# Patient Record
Sex: FEMALE | Race: OTHER
[De-identification: names, ages, dates, MRNs, and addresses within clinical notes are randomized per-mention and may not be internally consistent; named-entity substitution may affect disease eponyms.]

---

## 2017-08-30 ENCOUNTER — HOSPITAL ENCOUNTER (EMERGENCY)
Dept: HOSPITAL 31 - C.ER | Age: 47
Discharge: HOME | End: 2017-08-30
Payer: COMMERCIAL

## 2017-08-30 VITALS — HEART RATE: 87 BPM

## 2017-08-30 VITALS — OXYGEN SATURATION: 100 % | RESPIRATION RATE: 20 BRPM

## 2017-08-30 VITALS — BODY MASS INDEX: 31.8 KG/M2

## 2017-08-30 VITALS — DIASTOLIC BLOOD PRESSURE: 93 MMHG | SYSTOLIC BLOOD PRESSURE: 153 MMHG

## 2017-08-30 DIAGNOSIS — E11.65: Primary | ICD-10-CM

## 2017-08-30 DIAGNOSIS — E78.00: ICD-10-CM

## 2017-08-30 DIAGNOSIS — I10: ICD-10-CM

## 2017-08-30 LAB
ALBUMIN/GLOB SERPL: 1.1 {RATIO} (ref 1–2.1)
ALP SERPL-CCNC: 276 U/L (ref 38–126)
ALT SERPL-CCNC: 37 U/L (ref 9–52)
AST SERPL-CCNC: 29 U/L (ref 14–36)
BASOPHILS # BLD AUTO: 0 K/UL (ref 0–0.2)
BASOPHILS NFR BLD: 0.6 % (ref 0–2)
BILIRUB SERPL-MCNC: 0.9 MG/DL (ref 0.2–1.3)
BILIRUB UR-MCNC: NEGATIVE MG/DL
BUN SERPL-MCNC: 13 MG/DL (ref 7–17)
CALCIUM SERPL-MCNC: 9.5 MG/DL (ref 8.6–10.4)
CHLORIDE SERPL-SCNC: 98 MMOL/L (ref 98–107)
CO2 SERPL-SCNC: 24 MMOL/L (ref 22–30)
EOSINOPHIL # BLD AUTO: 0.1 K/UL (ref 0–0.7)
EOSINOPHIL NFR BLD: 1.5 % (ref 0–4)
ERYTHROCYTE [DISTWIDTH] IN BLOOD BY AUTOMATED COUNT: 13.5 % (ref 11.5–14.5)
GLOBULIN SER-MCNC: 3.6 GM/DL (ref 2.2–3.9)
GLUCOSE SERPL-MCNC: 409 MG/DL (ref 65–105)
GLUCOSE UR STRIP-MCNC: (no result) MG/DL
HCT VFR BLD CALC: 40.9 % (ref 34–47)
KETONES UR STRIP-MCNC: NEGATIVE MG/DL
LEUKOCYTE ESTERASE UR-ACNC: (no result) LEU/UL
LYMPHOCYTES # BLD AUTO: 2.4 K/UL (ref 1–4.3)
LYMPHOCYTES NFR BLD AUTO: 35.7 % (ref 20–40)
MCH RBC QN AUTO: 27.3 PG (ref 27–31)
MCHC RBC AUTO-ENTMCNC: 33.1 G/DL (ref 33–37)
MCV RBC AUTO: 82.5 FL (ref 81–99)
MONOCYTES # BLD: 0.6 K/UL (ref 0–0.8)
MONOCYTES NFR BLD: 9.1 % (ref 0–10)
NRBC BLD AUTO-RTO: 0.1 % (ref 0–2)
PH UR STRIP: 5 [PH] (ref 5–8)
PLATELET # BLD: 189 K/UL (ref 130–400)
PMV BLD AUTO: 10.2 FL (ref 7.2–11.7)
POTASSIUM SERPL-SCNC: 4.1 MMOL/L (ref 3.6–5.2)
PROT SERPL-MCNC: 7.6 G/DL (ref 6.3–8.3)
PROT UR STRIP-MCNC: NEGATIVE MG/DL
RBC # UR STRIP: NEGATIVE /UL
RBC #/AREA URNS HPF: < 1 /HPF (ref 0–3)
SODIUM SERPL-SCNC: 138 MMOL/L (ref 132–148)
SP GR UR STRIP: 1.02 (ref 1–1.03)
UROBILINOGEN UR-MCNC: NORMAL MG/DL (ref 0.2–1)
WBC # BLD AUTO: 6.8 K/UL (ref 4.8–10.8)
WBC #/AREA URNS HPF: < 1 /HPF (ref 0–5)

## 2017-08-30 PROCEDURE — 93005 ELECTROCARDIOGRAM TRACING: CPT

## 2017-08-30 PROCEDURE — 87086 URINE CULTURE/COLONY COUNT: CPT

## 2017-08-30 PROCEDURE — 84484 ASSAY OF TROPONIN QUANT: CPT

## 2017-08-30 PROCEDURE — 96360 HYDRATION IV INFUSION INIT: CPT

## 2017-08-30 PROCEDURE — 82948 REAGENT STRIP/BLOOD GLUCOSE: CPT

## 2017-08-30 PROCEDURE — 85025 COMPLETE CBC W/AUTO DIFF WBC: CPT

## 2017-08-30 PROCEDURE — 82009 KETONE BODYS QUAL: CPT

## 2017-08-30 PROCEDURE — 71020: CPT

## 2017-08-30 PROCEDURE — 99285 EMERGENCY DEPT VISIT HI MDM: CPT

## 2017-08-30 PROCEDURE — 82550 ASSAY OF CK (CPK): CPT

## 2017-08-30 PROCEDURE — 80053 COMPREHEN METABOLIC PANEL: CPT

## 2017-08-30 PROCEDURE — 82553 CREATINE MB FRACTION: CPT

## 2017-08-30 PROCEDURE — 81001 URINALYSIS AUTO W/SCOPE: CPT

## 2017-08-30 NOTE — C.PDOC
History Of Present Illness


48 y/o F c PMHx HTN, SVT, on metoprolol, HLD, DM p/w R arm paresthesias and 

palpitations x 2 days. Tonight, she took her fingerstick at home and found it 

to be over 500 and came to ED. She reports increased urination and increased 

thirst. She denies chest pain, vomiting, dyspnea, abdominal pain.


Time Seen by Provider: 17 02:19


Chief Complaint (Nursing): High Blood Sugar


History Per: Patient


History/Exam Limitations: no limitations


Onset/Duration Of Symptoms: Days (2)


Current Symptoms Are (Timing): Still Present


Severity: Mild


Associated Infectious Symptoms: Urinary Frequency, Other (Increased thirst)


Recent travel outside of the [place]: No


Additional History Per: Patient





Past Medical History


Reviewed: Historical Data, Nursing Documentation, Vital Signs


Vital Signs: 


 Last Vital Signs











Temp      


 


Pulse  87   17 03:29


 


Resp  20   17 03:29


 


BP  169/92 H  17 03:29


 


Pulse Ox  100   17 03:29














- Medical History


PMH: Anxiety, Diabetes, HTN, Hypercholesterolemia, Malignancy (Ovarian CA)


   Denies: Chronic Kidney Disease


Surgical History: Appendectomy


Family History: States: Unknown Family Hx





- Social History


Hx Tobacco Use: No


Hx Alcohol Use: No


Hx Substance Use: No





- Immunization History


Hx Tetanus Toxoid Vaccination: No


Hx Influenza Vaccination: No


Hx Pneumococcal Vaccination: No





Review Of Systems


Except As Marked, All Systems Reviewed And Found Negative.


Constitutional: Positive for: Other (Increased thirst).  Negative for: Fever, 

Sweats


Cardiovascular: Positive for: Palpitations.  Negative for: Chest Pain, Light 

Headedness


Respiratory: Negative for: Shortness of Breath


Gastrointestinal: Negative for: Vomiting, Abdominal Pain


Genitourinary: Positive for: Frequency (Increased urination)


Musculoskeletal: Positive for: Arm Pain (Right arm paresthesias)





Physical Exam





- Physical Exam


Additional Physical Exam Comments: 





Constitutional: No acute distress.


Head: Normocephalic. Atraumatic.


Eyes: PERRL.


ENT: Moist mucous membranes.


Neck: Supple.


Cardiovascular: Tachycardic. Radial pulse 2+ bilaterally.


Chest: No tenderness.


Respiratory: Clear to auscultation bilaterally.


GI: Soft. Nontender. Nondistended.


Back: No CVA tenderness.


Musculoskeletal: No tenderness or swelling of extremities.


Skin: No rash.


Neurologic: Alert, no focal deficit.





ED Course And Treatment





- Laboratory Results


Result Diagrams: 


 17 03:01





 17 03:01


ECG: Interpreted By Me, Viewed By Me


ECG Rhythm: Sinus Rhythm (111)


ECG Interpretation: Normal


Interpretation Of ECG: No ST/T wave changes.


Rate From EC


O2 Sat by Pulse Oximetry: 100 (RA)


Pulse Ox Interpretation: Normal





Medical Decision Making


Medical Decision Making: 


Impression: R arm paresthesias and palpitations x 2 days





Plans:


* Blood work up


* CXR


* IV fluids


* UA











EKG Sinus rhythm, 104 bpm, no ST/T wave changes.





CXR no infiltrate or consolidation.





Hyperglycemia on labs without acidosis or ketosis.





Blood pressure improved and glucose 313 on repeat fingerstick. Will discharge 

home, f/u PMD this week, return to ED for worsening dyspnea, vomiting, pain, 

fever, or any other problem.





Disposition





- Disposition


Disposition: HOME/ ROUTINE


Disposition Time: 04:09


Condition: STABLE


Instructions:  Diabetic Hyperglycemia (ED)


Forms:  [facility] (English)





- Clinical Impression


Clinical Impression: 


 Hyperglycemia








- Scribe Statement


The provider has reviewed the documentation as recorded by the Scribe





Karuna orellana





All medical record entries made by the Scribe were at my direction and 

personally dictated by me. I have reviewed the chart and agree that the record 

accurately reflects my personal performance of the history, physical exam, 

medical decision making, and the department course for this patient. I have 

also personally directed, reviewed, and agree with the discharge instructions 

and disposition.

## 2017-08-30 NOTE — RAD
HISTORY:

cough  



COMPARISON:

11/10/2016 



TECHNIQUE:

Chest PA and lateral



FINDINGS:



LUNGS:

The lungs are well inflated and clear.



PLEURA:

No significant pleural effusion identified. No pneumothorax apparent.



CARDIOVASCULAR:

Normal.



OSSEOUS STRUCTURES:

No significant abnormalities.



VISUALIZED UPPER ABDOMEN:

Normal.



OTHER FINDINGS:

None.



IMPRESSION:

No active pulmonary disease.

## 2017-08-30 NOTE — CARD
--------------- APPROVED REPORT --------------





EKG Measurement

Heart [id number]

NH [id number]

[id number]

[id number]

[id number]



<Conclusion>

Sinus tachycardia

Poor R wave progression, possible due to lead placement

Borderline ECG

## 2018-02-12 ENCOUNTER — HOSPITAL ENCOUNTER (EMERGENCY)
Dept: HOSPITAL 31 - C.ER | Age: 48
Discharge: HOME | End: 2018-02-12
Payer: COMMERCIAL

## 2018-02-12 VITALS
DIASTOLIC BLOOD PRESSURE: 74 MMHG | SYSTOLIC BLOOD PRESSURE: 128 MMHG | TEMPERATURE: 98.2 F | HEART RATE: 84 BPM | OXYGEN SATURATION: 98 %

## 2018-02-12 VITALS — BODY MASS INDEX: 31.8 KG/M2

## 2018-02-12 VITALS — RESPIRATION RATE: 18 BRPM

## 2018-02-12 DIAGNOSIS — R51: ICD-10-CM

## 2018-02-12 DIAGNOSIS — I10: Primary | ICD-10-CM

## 2018-02-12 LAB
ALBUMIN SERPL-MCNC: 4.3 G/DL (ref 3.5–5)
ALBUMIN/GLOB SERPL: 1.1 {RATIO} (ref 1–2.1)
ALT SERPL-CCNC: 46 U/L (ref 9–52)
APTT BLD: 29 SECONDS (ref 21–34)
AST SERPL-CCNC: 42 U/L (ref 14–36)
BACTERIA #/AREA URNS HPF: (no result) /[HPF]
BASOPHILS # BLD AUTO: 0 K/UL (ref 0–0.2)
BASOPHILS NFR BLD: 0.6 % (ref 0–2)
BILIRUB UR-MCNC: NEGATIVE MG/DL
BUN SERPL-MCNC: 14 MG/DL (ref 7–17)
CALCIUM SERPL-MCNC: 9.7 MG/DL (ref 8.6–10.4)
EOSINOPHIL # BLD AUTO: 0.1 K/UL (ref 0–0.7)
EOSINOPHIL NFR BLD: 1.2 % (ref 0–4)
ERYTHROCYTE [DISTWIDTH] IN BLOOD BY AUTOMATED COUNT: 13.9 % (ref 11.5–14.5)
GFR NON-AFRICAN AMERICAN: > 60
GLUCOSE UR STRIP-MCNC: NORMAL MG/DL
HCG,QUALITATIVE URINE: NEGATIVE
HGB BLD-MCNC: 14.4 G/DL (ref 11–16)
INR PPP: 1.1
LEUKOCYTE ESTERASE UR-ACNC: NEGATIVE LEU/UL
LYMPHOCYTES # BLD AUTO: 3.2 K/UL (ref 1–4.3)
LYMPHOCYTES NFR BLD AUTO: 40.3 % (ref 20–40)
MAGNESIUM SERPL-MCNC: 1.8 MG/DL (ref 1.6–2.3)
MCH RBC QN AUTO: 27.6 PG (ref 27–31)
MCHC RBC AUTO-ENTMCNC: 33.7 G/DL (ref 33–37)
MCV RBC AUTO: 81.9 FL (ref 81–99)
MONOCYTES # BLD: 0.6 K/UL (ref 0–0.8)
MONOCYTES NFR BLD: 7.1 % (ref 0–10)
NEUTROPHILS # BLD: 4 K/UL (ref 1.8–7)
NEUTROPHILS NFR BLD AUTO: 50.8 % (ref 50–75)
NRBC BLD AUTO-RTO: 0 % (ref 0–2)
PH UR STRIP: 5 [PH] (ref 5–8)
PLATELET # BLD: 257 K/UL (ref 130–400)
PMV BLD AUTO: 9.3 FL (ref 7.2–11.7)
PROT UR STRIP-MCNC: NEGATIVE MG/DL
PROTHROMBIN TIME: 12 SECONDS (ref 9.7–12.2)
RBC # BLD AUTO: 5.21 MIL/UL (ref 3.8–5.2)
RBC # UR STRIP: NEGATIVE /UL
SP GR UR STRIP: 1.02 (ref 1–1.03)
SQUAMOUS EPITHIAL: 1 /HPF (ref 0–5)
URINE NITRATE: NEGATIVE
UROBILINOGEN UR-MCNC: NORMAL MG/DL (ref 0.2–1)
WBC # BLD AUTO: 7.9 K/UL (ref 4.8–10.8)

## 2018-02-12 PROCEDURE — 70450 CT HEAD/BRAIN W/O DYE: CPT

## 2018-02-12 PROCEDURE — 96375 TX/PRO/DX INJ NEW DRUG ADDON: CPT

## 2018-02-12 PROCEDURE — 85025 COMPLETE CBC W/AUTO DIFF WBC: CPT

## 2018-02-12 PROCEDURE — 81001 URINALYSIS AUTO W/SCOPE: CPT

## 2018-02-12 PROCEDURE — 93005 ELECTROCARDIOGRAM TRACING: CPT

## 2018-02-12 PROCEDURE — 80053 COMPREHEN METABOLIC PANEL: CPT

## 2018-02-12 PROCEDURE — 83735 ASSAY OF MAGNESIUM: CPT

## 2018-02-12 PROCEDURE — 85610 PROTHROMBIN TIME: CPT

## 2018-02-12 PROCEDURE — 96374 THER/PROPH/DIAG INJ IV PUSH: CPT

## 2018-02-12 PROCEDURE — 85730 THROMBOPLASTIN TIME PARTIAL: CPT

## 2018-02-12 PROCEDURE — 84484 ASSAY OF TROPONIN QUANT: CPT

## 2018-02-12 PROCEDURE — 99284 EMERGENCY DEPT VISIT MOD MDM: CPT

## 2018-02-12 PROCEDURE — 84703 CHORIONIC GONADOTROPIN ASSAY: CPT

## 2018-02-12 NOTE — CT
EXAM:

  CT Head Without Intravenous Contrast



CLINICAL HISTORY:

  47 years old, female; Condition or disease; Headache; Migraine; Aura effect 

not specified



TECHNIQUE:

  Axial computed tomography images of the head/brain without intravenous 

contrast.  All CT scans at this facility use one or more dose reduction 

techniques, viz.: automated exposure control; ma/kV adjustment per patient size 

(including targeted exams where dose is matched to indication; i.e. head); or 

iterative reconstruction technique.



COMPARISON:

  CT - HEAD W/O CONTRAST 2014-07-04 05:48



FINDINGS:

  Brain:  No intracranial hemorrhage.  No mass.  No definite edema.

  Ventricles:  No hydrocephalus.

  Bones/joints:  No acute fracture.

  Soft tissues:  Unremarkable.

  Sinuses:  No acute sinusitis.

  Mastoid air cells:  No mastoid effusion.

  Orbits:  Unremarkable as visualized.



IMPRESSION:     

1.  No definite acute intracranial abnormality.

## 2018-02-12 NOTE — C.PDOC
Time Seen by Provider: 18 19:55


Chief Complaint (Nursing): Headache


History Per: Patient


Onset/Duration Of Symptoms: Days (1), Gradual


Current Symptoms Are (Timing): Still Present


Severity: Moderate


Quality: "Pain"


Associated Symptoms: Nausea, Vomiting


Additional History Per: Prior Records





Past Medical History


Reviewed: Historical Data, Nursing Documentation, Vital Signs


Vital Signs: 





 Last Vital Signs











Temp  98.2 F   18 22:53


 


Pulse  84   18 22:53


 


Resp  18   18 22:53


 


BP  128/74   18 22:53


 


Pulse Ox  98   18 22:53














- Medical History


PMH: Anxiety, Diabetes, HTN, Hypercholesterolemia, Malignancy (Ovarian CA)


Surgical History: Appendectomy


Family History: States: Unknown Family Hx





- Social History


Hx Tobacco Use: No


Hx Alcohol Use: No


Hx Substance Use: No





- Immunization History


Hx Tetanus Toxoid Vaccination: No


Hx Influenza Vaccination: No


Hx Pneumococcal Vaccination: No





Review Of Systems


Except As Marked, All Systems Reviewed And Found Negative.


Constitutional: Negative for: Fever, Weakness


Eyes: Negative for: Vision Change


Cardiovascular: Negative for: Chest Pain


Respiratory: Negative for: Shortness of Breath


Gastrointestinal: Positive for: Nausea, Vomiting.  Negative for: Abdominal Pain


Musculoskeletal: Negative for: Neck Pain


Skin: Negative for: Rash


Neurological: Positive for: Headache.  Negative for: Weakness, Numbness, 

Incoordination, Change in Speech, Confusion, Seizures, Altered Mental Status





Physical Exam





- Physical Exam


Appears: Non-toxic, No Acute Distress


Skin: Normal Color, Warm, Dry, No Rash


Head: Atraumatic, Normacephalic


Eye(s): bilateral: Normal Inspection, PERRL, EOMI


Neck: Normal ROM, Supple


Cardiovascular: Rhythm Regular


Respiratory: Normal Breath Sounds, No Accessory Muscle Use


Gastrointestinal/Abdominal: Soft, No Tenderness


Extremity: Normal ROM


Neurological/Psych: Oriented x3, Normal Speech, Normal Cognition, Normal Motor, 

Normal Sensation





ED Course And Treatment





- Laboratory Results


Result Diagrams: 


 18 21:16





 18 21:16


Lab Interpretation: No Acute Changes


Urine Pregnancy POC: Negative


ECG: Interpreted By Me, Viewed By Me


ECG Rhythm: Sinus Rhythm, Nonspecific Changes


ECG Interpretation: No Acute Changes


Rate From EC


O2 Sat by Pulse Oximetry: 98


Pulse Ox Interpretation: Normal





- CT Scan/US


  ** CT head


Other Rad Studies (CT/US): Read By Radiologist, Radiology Report Reviewed


CT/US Interpretation: IMPRESSION:  1.  No definite acute intracranial 

abnormality.





Progress





- Interventions


Interventions:: Observation, Intravenous fluid





- Medications Administered


Oral: Acetaminophen


Intravenous: Antiemetic, H-2 blocker, NSAID





- Data Reviewed


Data Reviewed: Lab, Diagnostic imaging, EKG, Old records





- Patient Status


Patient status: Mostly improved





- Continuity of Care


Discussed patient case with:: Patient, ED Nurse





- Patient Plan


Patient Plan: Discharge, F/U with PCP, Continue present meds





Medical Decision Making


Medical Decision Making: 


Pt take Metoprolol for hypertension. She took it today. 





Disposition


Counseled Patient/Family Regarding: Studies Performed, Diagnosis, Need For 

Followup, Rx Given





- Disposition


Disposition: HOME/ ROUTINE


Disposition Time: 23:11


Condition: IMPROVED


Additional Instructions: 


Follow up with your doctor this week. Return to the ER if you develop weakness, 

numbness, severe headache, worsening of symptoms or if you have any other 

concerns. 


Prescriptions: 


Metoclopramide [Reglan] 1 tab PO TID PRN #15 tab


 PRN Reason: Nausea/Vomiting


Instructions:  Acute Headache (ED)


Forms:  [facility] (English)





- Clinical Impression


Clinical Impression: 


 Headache, Transient hypertension

## 2018-02-14 NOTE — CARD
--------------- APPROVED REPORT --------------





EKG Measurement

Heart [id number]

SC [id number]

[id number]

[id number]

[id number]



<Conclusion>

Normal sinus rhythm

Possible Left atrial enlargement

Possible Anterior infarct, age undetermined

Abnormal ECG

## 2018-05-30 ENCOUNTER — HOSPITAL ENCOUNTER (OUTPATIENT)
Dept: HOSPITAL 31 - C.ER | Age: 48
Setting detail: OBSERVATION
LOS: 2 days | Discharge: HOME | End: 2018-06-01
Attending: INTERNAL MEDICINE | Admitting: INTERNAL MEDICINE
Payer: COMMERCIAL

## 2018-05-30 VITALS — BODY MASS INDEX: 31.8 KG/M2

## 2018-05-30 DIAGNOSIS — E11.65: ICD-10-CM

## 2018-05-30 DIAGNOSIS — R07.9: ICD-10-CM

## 2018-05-30 DIAGNOSIS — Z85.43: ICD-10-CM

## 2018-05-30 DIAGNOSIS — E78.5: ICD-10-CM

## 2018-05-30 DIAGNOSIS — R10.9: ICD-10-CM

## 2018-05-30 DIAGNOSIS — I10: Primary | ICD-10-CM

## 2018-05-30 DIAGNOSIS — R51: ICD-10-CM

## 2018-05-30 DIAGNOSIS — R42: ICD-10-CM

## 2018-05-30 LAB
ALBUMIN SERPL-MCNC: 4 G/DL (ref 3.5–5)
ALBUMIN/GLOB SERPL: 1.1 {RATIO} (ref 1–2.1)
ALT SERPL-CCNC: 32 U/L (ref 9–52)
AST SERPL-CCNC: 42 U/L (ref 14–36)
BASOPHILS # BLD AUTO: 0 K/UL (ref 0–0.2)
BASOPHILS NFR BLD: 0.6 % (ref 0–2)
BILIRUB UR-MCNC: NEGATIVE MG/DL
BUN SERPL-MCNC: 14 MG/DL (ref 7–17)
CALCIUM SERPL-MCNC: 9.2 MG/DL (ref 8.6–10.4)
CK MB SERPL-MCNC: 0.36 NG/ML (ref 0–3.38)
CK MB SERPL-MCNC: < 0.22 NG/ML (ref 0–3.38)
EOSINOPHIL # BLD AUTO: 0.1 K/UL (ref 0–0.7)
EOSINOPHIL NFR BLD: 0.9 % (ref 0–4)
ERYTHROCYTE [DISTWIDTH] IN BLOOD BY AUTOMATED COUNT: 13.3 % (ref 11.5–14.5)
GFR NON-AFRICAN AMERICAN: > 60
GLUCOSE UR STRIP-MCNC: (no result) MG/DL
HGB BLD-MCNC: 14.4 G/DL (ref 11–16)
LEUKOCYTE ESTERASE UR-ACNC: (no result) LEU/UL
LIPASE: 106 U/L (ref 23–300)
LYMPHOCYTES # BLD AUTO: 2.2 K/UL (ref 1–4.3)
LYMPHOCYTES NFR BLD AUTO: 29.8 % (ref 20–40)
MCH RBC QN AUTO: 28.6 PG (ref 27–31)
MCHC RBC AUTO-ENTMCNC: 33.8 G/DL (ref 33–37)
MCV RBC AUTO: 84.6 FL (ref 81–99)
MONOCYTES # BLD: 0.4 K/UL (ref 0–0.8)
MONOCYTES NFR BLD: 5.9 % (ref 0–10)
NEUTROPHILS # BLD: 4.6 K/UL (ref 1.8–7)
NEUTROPHILS NFR BLD AUTO: 62.8 % (ref 50–75)
NRBC BLD AUTO-RTO: 0.1 % (ref 0–2)
PH UR STRIP: 5 [PH] (ref 5–8)
PLATELET # BLD: 242 K/UL (ref 130–400)
PMV BLD AUTO: 9.7 FL (ref 7.2–11.7)
PROT UR STRIP-MCNC: (no result) MG/DL
RBC # BLD AUTO: 5.04 MIL/UL (ref 3.8–5.2)
RBC # UR STRIP: NEGATIVE /UL
SP GR UR STRIP: 1.01 (ref 1–1.03)
SQUAMOUS EPITHIAL: 2 /HPF (ref 0–5)
UROBILINOGEN UR-MCNC: NORMAL MG/DL (ref 0.2–1)
WBC # BLD AUTO: 7.3 K/UL (ref 4.8–10.8)

## 2018-05-30 PROCEDURE — 84443 ASSAY THYROID STIM HORMONE: CPT

## 2018-05-30 PROCEDURE — 81001 URINALYSIS AUTO W/SCOPE: CPT

## 2018-05-30 PROCEDURE — 80053 COMPREHEN METABOLIC PANEL: CPT

## 2018-05-30 PROCEDURE — 80048 BASIC METABOLIC PNL TOTAL CA: CPT

## 2018-05-30 PROCEDURE — 36415 COLL VENOUS BLD VENIPUNCTURE: CPT

## 2018-05-30 PROCEDURE — 82948 REAGENT STRIP/BLOOD GLUCOSE: CPT

## 2018-05-30 PROCEDURE — 85025 COMPLETE CBC W/AUTO DIFF WBC: CPT

## 2018-05-30 PROCEDURE — 82553 CREATINE MB FRACTION: CPT

## 2018-05-30 PROCEDURE — 84484 ASSAY OF TROPONIN QUANT: CPT

## 2018-05-30 PROCEDURE — 99285 EMERGENCY DEPT VISIT HI MDM: CPT

## 2018-05-30 PROCEDURE — 80061 LIPID PANEL: CPT

## 2018-05-30 PROCEDURE — 93005 ELECTROCARDIOGRAM TRACING: CPT

## 2018-05-30 PROCEDURE — 83690 ASSAY OF LIPASE: CPT

## 2018-05-30 PROCEDURE — 96361 HYDRATE IV INFUSION ADD-ON: CPT

## 2018-05-30 PROCEDURE — 83036 HEMOGLOBIN GLYCOSYLATED A1C: CPT

## 2018-05-30 PROCEDURE — 96360 HYDRATION IV INFUSION INIT: CPT

## 2018-05-30 PROCEDURE — 71046 X-RAY EXAM CHEST 2 VIEWS: CPT

## 2018-05-30 PROCEDURE — 93306 TTE W/DOPPLER COMPLETE: CPT

## 2018-05-30 RX ADMIN — INSULIN ASPART SCH: 100 INJECTION, SOLUTION INTRAVENOUS; SUBCUTANEOUS at 21:04

## 2018-05-30 NOTE — CP.PCM.HP
History of Present Illness





- History of Present Illness


History of Present Illness: 





CC: Chest pain





History Of Present Illness





Patient is a 46 y/o AA female, with a Hx of HTN and DM,Hyperlipidemia, 

extensive PMH of on and off palpitations, seen by cardiologist and been worked 

up ,also have h/o ovarian Carcinoma in past who presents to the ED BIBA for 

complaints of elevated blood pressure and palpitations. Patient noted 

experiencing a little chest pain and dizziness upon waking up this morning. In 

ambulance, patient's blood sugar, blood pressure, and heart rate were elevated. 

Admits to taking all medications compliantly. No other physical complaints at 

this time, chest pain is di=ull in character not associated with cough, 

diaphoresis, nausea, vomitting..





Present on Admission





- Present on Admission


Any Indicators Present on Admission: Yes





Review of Systems





- Review of Systems


Systems not reviewed;Unavailable: Acuity of Condition





- Constitutional


Constitutional: Fatigue, Malaise, Weakness





- EENT


Eyes: absent: As Per HPI, Blind Spots, Blurred Vision, Change in Vision, 

Decreased Night Vision, Diplopia, Discharge, Dry Eye, Exophthalmos, Floaters, 

Irritation, Itchy Eyes, Loss of Peripheral Vision, Pain, Photophobia, Requires 

Corrective Lenses, Sees Flashes, Spots in Vision, Tunnel Vision, Other Visual 

Disturbances, Loss of Vision, Other


Nose/Mouth/Throat: absent: As Per HPI, Epistaxis, Nasal Congestion, Nasal 

Discharge, Nasal Obstruction, Nasal Trauma, Nose Pain, Post Nasal Drip, Sinus 

Pain, Sinus Pressure, Bleeding Gums, Change in Voice, Dental Pain, Dry Mouth, 

Dysphagia, Halitosis, Hoarsness, Lip Swelling, Mouth Lesions, Mouth Pain, 

Odynophagia, Sore Throat, Throat Swelling, Tongue Swelling, Facial Pain, Neck 

Pain, Neck Mass, Other





- Cardiovascular


Cardiovascular: Chest Pain, Dyspnea





- Respiratory


Respiratory: Dyspnea, Chest Congestion





- Gastrointestinal


Gastrointestinal: absent: As Per HPI, Abdominal Pain, Belching, Bloating, 

Change in Bowel Habits, Change in Stool Character, Coffee Ground Emesis, 

Constipation, Cramping, Diarrhea, Dyspepsia, Dysphagia, Early Satiety, 

Excessive Flatus, Fecal Incontinence, Heartburn, Hematemesis, Hematochezia, 

Loose Stools, Melena, Nausea, Odynophagia, Temesmus, Vomiting, Other





- Genitourinary


Genitourinary: absent: As Per HPI, Change in Urinary Stream, Difficulty 

Urinating, Dysuria, Flank Pain, Hematuria, Pyuria, Nocturia, Urinary 

Incontinence, Urinary Frequency, Urinary Hesitance, Urinary Urgency, Voiding 

Freq/Small Amts, Freq UTI, Hx Renal/Bladder Calculi, Hx /Renal Surgery, 

Bladder Distension, Other





- Musculoskeletal


Musculoskeletal: Back Pain, Myalgias





- Integumentary


Integumentary: absent: As Per HPI, Acne, Alopecia, Bleeding Lesions, Change in 

Hair, Change in Nails, Change in Pigmentation, Changing Lesions, Dry Skin, 

Erythema, Furuncle, Hirsutism, Lesions, New Lesions, Non-Healing Lesions, 

Photosensitivity, Pruritus, Rash, Skin Pain, Skin Ulcer, Sores, Striae, Swelling

, Unusual Bruising, Wounds, Jaundice, Other





- Neurological


Neurological: Dizziness, Headaches.  absent: As Per HPI, Abnormal Gait, 

Abnormal Hearing, Abnormal Movements, Abnormal Speech, Behavioral Changes, 

Burning Sensations, Confusion, Convulsions, Disequilibrium, Numbness, Focal 

Weakness, Frequent Falls, Lack of Coordination, Loss of Vision, Memory Loss, 

Paresthesias, Radicular Pain, Restless Legs, Sensory Deficit, Syncope, Tingling

, Tremor, Vertigo, Weakness, Other Visual Disturbances, Other





Past Patient History





- Infectious Disease


Hx of Infectious Diseases: None





- Tetanus Immunizations


Tetanus Immunization: Unknown





- Past Medical History & Family History


Past Medical History?: Yes





- Past Social History


Smoking Status: Never Smoked





- CARDIAC


Hx Hypercholesterolemia: Yes


Hx Hypertension: Yes





- PULMONARY


Hx Respiratory Disorders: No





- NEUROLOGICAL


Hx Neurological Disorder: No





- HEENT


Hx HEENT Problems: No





- RENAL


Hx Chronic Kidney Disease: No





- ENDOCRINE/METABOLIC


Hx Diabetes Mellitus Type 2: Yes





- HEMATOLOGICAL/ONCOLOGICAL


Hx Blood Disorders: No


Hx Cancer: Yes (Ovarian cancer)


Hx Chemotherapy: Yes





- INTEGUMENTARY


Hx Dermatological Problems: No





- MUSCULOSKELETAL/RHEUMATOLOGICAL


Hx Musculoskeletal Disorders: No


Hx Falls: No





- GASTROINTESTINAL


Hx Gastrointestinal Disorders: No





- GENITOURINARY/GYNECOLOGICAL


Hx Genitourinary Disorders: No


Hx Ovarian Cancer: Yes





- PSYCHIATRIC


Hx Anxiety: Yes


Hx Substance Use: No





- SURGICAL HISTORY


Hx Appendectomy: Yes





- ANESTHESIA


Hx Anesthesia: Yes


Hx Anesthesia Reactions: No





Meds


Allergies/Adverse Reactions: 


 Allergies











Allergy/AdvReac Type Severity Reaction Status Date / Time


 


No Known Allergies Allergy   Verified 05/30/18 08:12














Physical Exam





- Constitutional


Appears: No Acute Distress





- Eye Exam


Eye Exam: EOMI, Normal appearance, PERRL


Pupil Exam: NORMAL ACCOMODATION, PERRL





- ENT Exam


ENT Exam: Mucous Membranes Moist, Normal Exam





- Respiratory Exam


Respiratory Exam: Clear to Auscultation Bilateral, NORMAL BREATHING PATTERN





- Cardiovascular Exam


Cardiovascular Exam: REGULAR RHYTHM





- GI/Abdominal Exam


GI & Abdominal Exam: Normal Bowel Sounds, Soft.  absent: Tenderness





Results





- Vital Signs


Recent Vital Signs: 





 Last Vital Signs











Temp  98.4 F   05/30/18 21:06


 


Pulse  88   05/30/18 21:06


 


Resp  18   05/30/18 21:06


 


BP  158/95 H  05/30/18 21:06


 


Pulse Ox  97   05/30/18 20:00














- Labs


Result Diagrams: 


 05/30/18 08:30





 05/30/18 08:30


Labs: 





 Laboratory Results - last 24 hr











  05/30/18 05/30/18 05/30/18





  08:09 08:30 08:30


 


WBC    7.3


 


RBC    5.04


 


Hgb    14.4


 


Hct    42.6


 


MCV    84.6  D


 


MCH    28.6


 


MCHC    33.8


 


RDW    13.3


 


Plt Count    242


 


MPV    9.7


 


Neut % (Auto)    62.8


 


Lymph % (Auto)    29.8


 


Mono % (Auto)    5.9


 


Eos % (Auto)    0.9


 


Baso % (Auto)    0.6


 


Neut # (Auto)    4.6


 


Lymph # (Auto)    2.2


 


Mono # (Auto)    0.4


 


Eos # (Auto)    0.1


 


Baso # (Auto)    0.0


 


Sodium   140 


 


Potassium   4.0 


 


Chloride   101 


 


Carbon Dioxide   26 


 


Anion Gap   18 


 


BUN   14 


 


Creatinine   0.6 L 


 


Est GFR ( Amer)   > 60 


 


Est GFR (Non-Af Amer)   > 60 


 


POC Glucose (mg/dL)  285 H  


 


Random Glucose   324 H 


 


Calcium   9.2 


 


Total Bilirubin   1.0 


 


AST   42 H 


 


ALT   32 


 


Alkaline Phosphatase   173 H D 


 


Total Creatine Kinase   


 


CK-MB (Mass)   < 0.22 


 


Troponin I   < 0.0120 


 


Total Protein   7.6 


 


Albumin   4.0 


 


Globulin   3.6 


 


Albumin/Globulin Ratio   1.1 


 


Lipase   106 


 


Urine Color   


 


Urine Clarity   


 


Urine pH   


 


Ur Specific Gravity   


 


Urine Protein   


 


Urine Glucose (UA)   


 


Urine Ketones   


 


Urine Blood   


 


Urine Nitrate   


 


Urine Bilirubin   


 


Urine Urobilinogen   


 


Ur Leukocyte Esterase   


 


Urine WBC (Auto)   


 


Ur Squamous Epith Cells   














  05/30/18 05/30/18 05/30/18





  08:41 16:49 20:29


 


WBC   


 


RBC   


 


Hgb   


 


Hct   


 


MCV   


 


MCH   


 


MCHC   


 


RDW   


 


Plt Count   


 


MPV   


 


Neut % (Auto)   


 


Lymph % (Auto)   


 


Mono % (Auto)   


 


Eos % (Auto)   


 


Baso % (Auto)   


 


Neut # (Auto)   


 


Lymph # (Auto)   


 


Mono # (Auto)   


 


Eos # (Auto)   


 


Baso # (Auto)   


 


Sodium   


 


Potassium   


 


Chloride   


 


Carbon Dioxide   


 


Anion Gap   


 


BUN   


 


Creatinine   


 


Est GFR ( Amer)   


 


Est GFR (Non-Af Amer)   


 


POC Glucose (mg/dL)   189 H 


 


Random Glucose   


 


Calcium   


 


Total Bilirubin   


 


AST   


 


ALT   


 


Alkaline Phosphatase   


 


Total Creatine Kinase    90


 


CK-MB (Mass)    0.36


 


Troponin I    < 0.0120


 


Total Protein   


 


Albumin   


 


Globulin   


 


Albumin/Globulin Ratio   


 


Lipase   


 


Urine Color  Yellow  


 


Urine Clarity  Clear  


 


Urine pH  5.0  


 


Ur Specific Gravity  1.014  


 


Urine Protein  1+ H  


 


Urine Glucose (UA)  3+ H  


 


Urine Ketones  Negative  


 


Urine Blood  Negative  


 


Urine Nitrate  Negative  


 


Urine Bilirubin  Negative  


 


Urine Urobilinogen  Normal  


 


Ur Leukocyte Esterase  Neg  


 


Urine WBC (Auto)  < 1  


 


Ur Squamous Epith Cells  2  














  05/30/18





  20:47


 


WBC 


 


RBC 


 


Hgb 


 


Hct 


 


MCV 


 


MCH 


 


MCHC 


 


RDW 


 


Plt Count 


 


MPV 


 


Neut % (Auto) 


 


Lymph % (Auto) 


 


Mono % (Auto) 


 


Eos % (Auto) 


 


Baso % (Auto) 


 


Neut # (Auto) 


 


Lymph # (Auto) 


 


Mono # (Auto) 


 


Eos # (Auto) 


 


Baso # (Auto) 


 


Sodium 


 


Potassium 


 


Chloride 


 


Carbon Dioxide 


 


Anion Gap 


 


BUN 


 


Creatinine 


 


Est GFR ( Amer) 


 


Est GFR (Non-Af Amer) 


 


POC Glucose (mg/dL)  188 H


 


Random Glucose 


 


Calcium 


 


Total Bilirubin 


 


AST 


 


ALT 


 


Alkaline Phosphatase 


 


Total Creatine Kinase 


 


CK-MB (Mass) 


 


Troponin I 


 


Total Protein 


 


Albumin 


 


Globulin 


 


Albumin/Globulin Ratio 


 


Lipase 


 


Urine Color 


 


Urine Clarity 


 


Urine pH 


 


Ur Specific Gravity 


 


Urine Protein 


 


Urine Glucose (UA) 


 


Urine Ketones 


 


Urine Blood 


 


Urine Nitrate 


 


Urine Bilirubin 


 


Urine Urobilinogen 


 


Ur Leukocyte Esterase 


 


Urine WBC (Auto) 


 


Ur Squamous Epith Cells 














Assessment & Plan


(1) Hypertension


Assessment and Plan: 


losrtan


cardiac eval


ekg


cardiac enzymes


Status: Acute   





(2) Chest pain


Status: Acute   





(3) Abdominal pain


Status: Acute   





(4) Dizziness


Status: Acute   





(5) Headache


Status: Acute   





(6) Diabetes


Assessment and Plan: 


ACCUCHECK


sliding scale insulin


Status: Acute

## 2018-05-30 NOTE — C.PDOC
Patient is a 48 y/o female, with a Hx of HTN and DM, who presents to the ED 

BIBA for complaints of elevated blood pressure and palpitations. Patient noted 

experiencing a little chest pain and dizziness upon waking up this morning. In 

ambulance, patient's blood sugar, blood pressure, and heart rate were elevated. 

Admits to taking all medications compliantly. No other physical complaints at 

this time.  (TayMary)


History Per: Patient, EMS


History/Exam Limitations: no limitations


Onset/Duration Of Symptoms: Hrs (this morning)


Current Symptoms Are (Timing): Still Present


Associated Symptoms: Chest Pain, Dizziness


Quality Of Symptoms: Rapid Heart Rate


Recent travel outside of the [place]: No





<Mary Cross - Last Filed: 05/30/18 08:31>





<Rosina Silva - Last Filed: 05/30/18 09:36>


Time Seen by Provider: 05/30/18 07:51


Chief Complaint (Nursing): Palpitations





Past Medical History


Reviewed: Historical Data, Nursing Documentation, Vital Signs





- Medical History


PMH: Anxiety, Diabetes, HTN, Hypercholesterolemia, Malignancy (Ovarian CA)


   Denies: Chronic Kidney Disease


Surgical History: Appendectomy


Family History: States: Unknown Family Hx





- Social History


Hx Tobacco Use: No


Hx Alcohol Use: No


Hx Substance Use: No





- Immunization History


Hx Tetanus Toxoid Vaccination: No


Hx Influenza Vaccination: Yes


Hx Pneumococcal Vaccination: No





<Mary Cross - Last Filed: 05/30/18 08:31>


Vital Signs: 





 Last Vital Signs











Temp  98.5 F   05/30/18 08:06


 


Pulse  102 H  05/30/18 08:06


 


Resp  15   05/30/18 08:06


 


BP  169/85 H  05/30/18 08:36


 


Pulse Ox  98   05/30/18 08:39














Review Of Systems


Cardiovascular: Positive for: Chest Pain, Palpitations


Neurological: Positive for: Dizziness





<Mary Cross - Last Filed: 05/30/18 08:31>





Physical Exam





- Physical Exam


Appears: Well, Non-toxic, No Acute Distress


Skin: Normal Color, Warm, Dry


Head: Atraumatic, Normacephalic


Eye(s): bilateral: Normal Inspection


Nose: Normal


Oral Mucosa: Moist


Chest: Symmetrical


Cardiovascular: Rhythm Regular, No Murmur


Respiratory: Normal Breath Sounds, No Rales, No Rhonchi, No Wheezing


Gastrointestinal/Abdominal: Soft, No Tenderness


Neurological/Psych: Oriented x3, Normal Speech, Normal Cognition





<Mary Cross - Last Filed: 05/30/18 08:31>





ED Course And Treatment


O2 Sat by Pulse Oximetry: 98


Progress Note: Blood work, CXR, and UA ordered. IV fluids administered.





<Mary Cross - Last Filed: 05/30/18 08:31>





- Laboratory Results


Result Diagrams: 


 05/30/18 08:30





 05/30/18 08:30





<Rosina Silva - Last Filed: 05/30/18 09:36>





Disposition





<Mary Cross - Last Filed: 05/30/18 08:31>





<Rosina Silva - Last Filed: 05/30/18 09:36>





- Disposition


Forms:  [facility] (English)





- Scribe Statement


The provider has reviewed the documentation as recorded by the Scribe





<Mary Cross - Last Filed: 05/30/18 08:31>





<Rosina Silva - Last Filed: 05/30/18 09:36>





- Scribe Statement





Marcella Rodriguez





All medical record entries made by the Scribe were at my direction and 

personally dictated by me. I have reviewed the chart and agree that the record 

accurately reflects my personal performance of the history, physical exam, 

medical decision making, and the department course for this patient. I have 

also personally directed, reviewed, and agree with the discharge instructions 

and disposition. (TayMary)

## 2018-05-30 NOTE — RAD
HISTORY:

SOB  



COMPARISON:

8/30/2017



TECHNIQUE:

Chest PA and lateral



FINDINGS:



LUNGS:

No active pulmonary disease.



PLEURA:

No significant pleural effusion identified. No pneumothorax apparent.



CARDIOVASCULAR:

Normal.



OSSEOUS STRUCTURES:

No significant abnormalities.



VISUALIZED UPPER ABDOMEN:

Normal.



OTHER FINDINGS:

None.



IMPRESSION:

No active disease.

## 2018-05-30 NOTE — C.PDOC
History Of Present Illness





Patient is a 46 y/o female, with a Hx of HTN and DM, who presents to the ED 

[facility] for complaints of elevated blood pressure and palpitations. Patient noted 

experiencing a little chest pain and dizziness upon waking up this morning. In 

ambulance, patient's blood sugar, blood pressure, and heart rate were elevated. 

Admits to taking all medications compliantly. No other physical complaints at 

this time.


Time Seen by Provider: 18 07:51


Chief Complaint (Nursing): Palpitations


History Per: Patient


History/Exam Limitations: no limitations


Onset/Duration Of Symptoms: Hrs (this morning)


Current Symptoms Are (Timing): Still Present


Associated Symptoms: Chest Pain, Dizziness


Quality Of Symptoms: Rapid Heart Rate


Recent travel outside of the [place]: No





Past Medical History


Reviewed: Historical Data, Nursing Documentation, Vital Signs


Vital Signs: 


 Last Vital Signs











Temp  98.5 F   18 08:06


 


Pulse  89   18 10:16


 


Resp  16   18 10:16


 


BP  173/91 H  18 10:16


 


Pulse Ox  98   18 10:23














- Medical History


PMH: Anxiety, Diabetes, HTN, Hypercholesterolemia, Malignancy (Ovarian CA)


   Denies: Chronic Kidney Disease


Surgical History: Appendectomy


Family History: States: No Known Family Hx





- Social History


Hx Tobacco Use: No


Hx Alcohol Use: No


Hx Substance Use: No





- Immunization History


Hx Tetanus Toxoid Vaccination: No


Hx Influenza Vaccination: Yes


Hx Pneumococcal Vaccination: No





Review Of Systems


Cardiovascular: Positive for: Chest Pain, Palpitations


Neurological: Positive for: Dizziness





Physical Exam





- Physical Exam


Appears: Well, Non-toxic


Skin: Normal Color, Warm, Dry


Head: Atraumatic, Normacephalic


Oral Mucosa: Moist


Chest: Symmetrical


Cardiovascular: Rhythm Regular, No Murmur


Respiratory: Normal Breath Sounds, No Rales, No Rhonchi, No Wheezing


Extremity: Normal ROM (x4)


Neurological/Psych: Oriented x3, Normal Speech, Normal Cognition, Normal Motor, 

Normal Sensation, Other (no focal deficits)





ED Course And Treatment





- Laboratory Results


Result Diagrams: 


 18 08:30





 18 08:30


Lab Interpretation: No Acute Changes


ECG: Interpreted By Me


ECG Rhythm: Sinus Tachycardia


ECG Interpretation: Normal


Rate From EC


O2 Sat by Pulse Oximetry: 98


Pulse Ox Interpretation: Normal





- Radiology


CXR: Interpreted by Me, Viewed By Me


CXR Interpretation: Yes: No Acute Disease


Progress Note: IV fluids administered. CXR ordered.  Patient took ASA 81 mg x 4 

PTA.  On re-evaluation feeling better, lungs clear


Reassessment Condition: Improved





- Physician Consult Information


Physician Contacted: Sawyer Escobar


Outcome Of Conversation: tele OBS





Disposition


Discussed With : Sawyer Escobar


Doctor Will See Patient In The: Hospital





- Disposition


Disposition: HOSPITALIZED


Disposition Time: 10:30


Condition: IMPROVED


Instructions:  Chest Pain


Forms:  [facility] (English)





- POA


Present On Arrival: None





- Clinical Impression


Clinical Impression: 


 Chest pain








- Scribe Statement


The provider has reviewed the documentation as recorded by the Scribe





Marcella Rodriguez





All medical record entries made by the Scribe were at my direction and 

personally dictated by me. I have reviewed the chart and agree that the record 

accurately reflects my personal performance of the history, physical exam, 

medical decision making, and the department course for this patient. I have 

also personally directed, reviewed, and agree with the discharge instructions 

and disposition.





Decision To Admit





- Pt Status Changed To:


Hospital Disposition Of: Observation





- .


Bed Request Type: Telemetry


Admitting Physician: Sawyer Escobar


Patient Diagnosis: 


 Chest pain

## 2018-05-31 LAB
BUN SERPL-MCNC: 11 MG/DL (ref 7–17)
CALCIUM SERPL-MCNC: 9 MG/DL (ref 8.6–10.4)
CK MB SERPL-MCNC: 0.57 NG/ML (ref 0–3.38)
GFR NON-AFRICAN AMERICAN: > 60
HDLC SERPL-MCNC: 44 MG/DL (ref 30–70)
LDLC SERPL-MCNC: 173 MG/DL (ref 0–129)

## 2018-05-31 RX ADMIN — METOPROLOL SUCCINATE SCH MG: 100 TABLET, EXTENDED RELEASE ORAL at 10:00

## 2018-05-31 RX ADMIN — ENOXAPARIN SODIUM SCH MG: 40 INJECTION SUBCUTANEOUS at 10:00

## 2018-05-31 RX ADMIN — INSULIN ASPART SCH UNIT: 100 INJECTION, SOLUTION INTRAVENOUS; SUBCUTANEOUS at 17:10

## 2018-05-31 RX ADMIN — INSULIN ASPART SCH UNIT: 100 INJECTION, SOLUTION INTRAVENOUS; SUBCUTANEOUS at 08:30

## 2018-05-31 RX ADMIN — INSULIN ASPART SCH UNIT: 100 INJECTION, SOLUTION INTRAVENOUS; SUBCUTANEOUS at 12:22

## 2018-05-31 RX ADMIN — INSULIN ASPART SCH: 100 INJECTION, SOLUTION INTRAVENOUS; SUBCUTANEOUS at 21:36

## 2018-05-31 NOTE — CP.PCM.CON
History of Present Illness





- History of Present Illness


History of Present Illness: 





46 yo female complaining of episodes of left sided chest pain on and off at 

rest and palpitation for the past few days. She is known to have a NIDDM, a HPTN

, a hypercholesterolemia, a Hx of avarian cancer, a documented paroxysmal SVT 

in the past, she had a normal stress test 3 years ago.


In the ED, her ECGrevealed an RSR, with no acute ST-T wave change. Serum TNI's 

x 3 are normal. HgbA1C: 11.9.





Review of Systems





- Cardiovascular


Cardiovascular: Chest Pain, Palpitations





Past Patient History





- Infectious Disease


Hx of Infectious Diseases: None





- Tetanus Immunizations


Tetanus Immunization: Unknown





- Past Medical History & Family History


Past Medical History?: Yes





- Past Social History


Smoking Status: Never Smoked


Alcohol: None


Drugs: Denies


Home Situation {Lives}: With Family


Domestic Violence: Negative





- CARDIAC


Hx Cardia Arrhythmia: Yes (Paroxysmal SVT.)


Hx Hypercholesterolemia: Yes


Hx Hypertension: Yes





- PULMONARY


Hx Respiratory Disorders: No





- NEUROLOGICAL


Hx Neurological Disorder: No





- HEENT


Hx HEENT Problems: No





- RENAL


Hx Chronic Kidney Disease: No





- ENDOCRINE/METABOLIC


Hx Diabetes Mellitus Type 2: Yes





- HEMATOLOGICAL/ONCOLOGICAL


Hx Blood Disorders: No


Hx Cancer: Yes (Ovarian cancer)


Hx Chemotherapy: Yes





- INTEGUMENTARY


Hx Dermatological Problems: No





- MUSCULOSKELETAL/RHEUMATOLOGICAL


Hx Musculoskeletal Disorders: No


Hx Falls: No





- GASTROINTESTINAL


Hx Gastrointestinal Disorders: No





- GENITOURINARY/GYNECOLOGICAL


Hx Genitourinary Disorders: No


Hx Ovarian Cancer: Yes





- PSYCHIATRIC


Hx Anxiety: Yes


Hx Substance Use: No





- SURGICAL HISTORY


Hx Appendectomy: Yes





- ANESTHESIA


Hx Anesthesia: Yes


Hx Anesthesia Reactions: No


Has any member of the family had a problem w/ anesthesia?: No





Meds


Allergies/Adverse Reactions: 


 Allergies











Allergy/AdvReac Type Severity Reaction Status Date / Time


 


No Known Allergies Allergy   Verified 05/30/18 08:12














- Medications


Medications: 


 Current Medications





Aspirin (Ecotrin)  81 mg PO DAILY [facility]


   Last Admin: 05/31/18 10:00 Dose:  81 mg


Enoxaparin Sodium (Lovenox)  40 mg SC DAILY [facility]


   Last Admin: 05/31/18 10:00 Dose:  40 mg


Insulin Asp[facility] (Novolog)  [facility]


   PRN Reason: Protocol


   Last Admin: 05/31/18 21:36 Dose:  Not Given


Losartan Potassium (Cozaar)  50 mg PO DAILY [facility]


   Last Admin: 05/31/18 10:00 Dose:  50 mg


Metoprolol Succinate (Toprol Xl)  100 mg PO DAILY [facility]


   Last Admin: 05/31/18 10:00 Dose:  100 mg


Rosuvastatin Calcium (Crestor)  20 mg PO HS [facility]


   Last Admin: 05/31/18 21:35 Dose:  20 mg


Sitagliptin Phosphate (Januvia)  100 mg PO DAILY [facility]


   Last Admin: 05/31/18 10:00 Dose:  100 mg











Physical Exam





- Constitutional


Appears: No Acute Distress





- Head Exam


Head Exam: NORMAL INSPECTION





- Eye Exam


Eye Exam: Normal appearance


Pupil Exam: NORMAL ACCOMODATION





- ENT Exam


ENT Exam: Normal Exam





- Neck Exam


Neck exam: Positive for: Normal Inspection





- Respiratory Exam


Respiratory Exam: Clear to Auscultation Bilateral, NORMAL BREATHING PATTERN





- Cardiovascular Exam


Cardiovascular Exam: REGULAR RHYTHM





- GI/Abdominal Exam


GI & Abdominal Exam: Normal Bowel Sounds, Soft





- Rectal Exam


Rectal Exam: Deferred





-  Exam


 Exam: NORMAL INSPECTION





- Extremities Exam


Extremities exam: Positive for: normal inspection





- Back Exam


Back exam: NORMAL INSPECTION





- Neurological Exam


Neurological exam: Alert, Oriented x3





- Psychiatric Exam


Psychiatric exam: Anxious





- Skin


Skin Exam: Dry, Normal Color, Warm





Results





- Vital Signs


Recent Vital Signs: 


 Last Vital Signs











Temp  97.9 F   05/31/18 15:30


 


Pulse  90   05/31/18 18:28


 


Resp  20   05/31/18 15:30


 


BP  123/79   05/31/18 15:30


 


Pulse Ox  98   05/31/18 22:38














- Labs


Result Diagrams: 


 05/30/18 08:30





 05/31/18 07:40


Labs: 


 Laboratory Results - last 24 hr











  05/31/18 05/31/18 05/31/18





  00:56 06:25 07:40


 


Sodium    139


 


Potassium    4.0


 


Chloride    103


 


Carbon Dioxide    24


 


Anion Gap    17


 


BUN    11


 


Creatinine    0.6 L


 


Est GFR ( Amer)    > 60


 


Est GFR (Non-Af Amer)    > 60


 


POC Glucose (mg/dL)   220 H 


 


Random Glucose    233 H


 


Hemoglobin A1c   


 


Calcium    9.0


 


Total Creatine Kinase  62  


 


CK-MB (Mass)  0.57  


 


Troponin I  < 0.0120  


 


Triglycerides   


 


Cholesterol   


 


LDL Cholesterol Direct   


 


HDL Cholesterol   


 


TSH 3rd Generation   














  05/31/18 05/31/18 05/31/18





  11:15 11:26 11:26


 


Sodium   


 


Potassium   


 


Chloride   


 


Carbon Dioxide   


 


Anion Gap   


 


BUN   


 


Creatinine   


 


Est GFR ( Amer)   


 


Est GFR (Non-Af Amer)   


 


POC Glucose (mg/dL)  282 H  


 


Random Glucose   


 


Hemoglobin A1c    11.9 H


 


Calcium   


 


Total Creatine Kinase   


 


CK-MB (Mass)   


 


Troponin I   


 


Triglycerides   214 H 


 


Cholesterol   255 H 


 


LDL Cholesterol Direct   173 H 


 


HDL Cholesterol   44 


 


TSH 3rd Generation   1.13 














  05/31/18 05/31/18





  18:31 21:04


 


Sodium  


 


Potassium  


 


Chloride  


 


Carbon Dioxide  


 


Anion Gap  


 


BUN  


 


Creatinine  


 


Est GFR ( Amer)  


 


Est GFR (Non-Af Amer)  


 


POC Glucose (mg/dL)  315 H  335 H


 


Random Glucose  


 


Hemoglobin A1c  


 


Calcium  


 


Total Creatine Kinase  


 


CK-MB (Mass)  


 


Troponin I  


 


Triglycerides  


 


Cholesterol  


 


LDL Cholesterol Direct  


 


HDL Cholesterol  


 


TSH 3rd Generation  














Assessment & Plan


(1) Chest pain


Assessment and Plan: 


Atypical. But CAD has to to be considered in a diabetic patient


Status: Acute   





(2) Uncontrolled diabetes mellitus


Status: Acute   





(3) Uncontrolled diabetes mellitus


Status: Acute   





(4) Hypertension


Status: Acute

## 2018-05-31 NOTE — CP.PCM.PN
Subjective





- Date & Time of Evaluation


Date of Evaluation: 05/31/18


Time of Evaluation: 19:00





- Subjective


Subjective: 





Pt seen and examined at bedside, c/o palpitations but no chest pain





Objective





- Vital Signs/Intake and Output


Vital Signs (last 24 hours): 


 











Temp Pulse Resp BP Pulse Ox


 


 97.9 F   90   20   123/79   98 


 


 05/31/18 15:30  05/31/18 18:28  05/31/18 15:30  05/31/18 15:30  05/31/18 22:38








Intake and Output: 


 











 05/31/18 06/01/18





 18:59 06:59


 


Intake Total  450


 


Balance  450














- Medications


Medications: 


 Current Medications





Aspirin (Ecotrin)  81 mg PO DAILY [facility]


   Last Admin: 05/31/18 10:00 Dose:  81 mg


Enoxaparin Sodium (Lovenox)  40 mg SC DAILY [facility]


   Last Admin: 05/31/18 10:00 Dose:  40 mg


[facility]  [facility]


   PRN Reason: Protocol


   Last Admin: 05/31/18 21:36 Dose:  Not Given


Losartan Potassium (Cozaar)  50 mg PO DAILY [facility]


   Last Admin: 05/31/18 10:00 Dose:  50 mg


Metoprolol Succinate (Toprol Xl)  100 mg PO DAILY [facility]


   Last Admin: 05/31/18 10:00 Dose:  100 mg


Rosuvastatin Calcium (Crestor)  20 mg PO HS [facility]


   Last Admin: 05/31/18 21:35 Dose:  20 mg


Sitagliptin Phosphate (Januvia)  100 mg PO DAILY [facility]


   Last Admin: 05/31/18 10:00 Dose:  100 mg











- Labs


Labs: 


 





 05/30/18 08:30 





 05/31/18 07:40 











- Constitutional


Appears: Well





- Eye Exam


Eye Exam: EOMI, Normal appearance, PERRL


Pupil Exam: NORMAL ACCOMODATION, PERRL





- ENT Exam


ENT Exam: Mucous Membranes Moist, Normal Exam





- Respiratory Exam


Respiratory Exam: Clear to Ausculation Bilateral, NORMAL BREATHING PATTERN





- Cardiovascular Exam


Cardiovascular Exam: REGULAR RHYTHM, +S1, +S2.  absent: Murmur





- Neurological Exam


Neurological Exam: Alert, Awake, CN II-XII Intact, Normal Gait, Oriented x3





- Psychiatric Exam


Psychiatric exam: Anxious





Assessment and Plan


(1) Hypertension


Status: Acute   





(2) Chest pain


Status: Acute   





(3) Abdominal pain


Status: Acute   





(4) Dizziness


Status: Acute   





(5) Headache


Status: Acute   





(6) Diabetes


Status: Acute   





(7) Hyperlipidemia


Status: Acute

## 2018-05-31 NOTE — CP.PCM.PCO
Addendum


Addendum: 





Simone Velasco PGY1





Patient states she has private cardiologist Dr. Peterson. Dr. Escobar is contacted 

and message was left with answering service, with my call back number. Dr. Peterson contacted and he accepts the consult since he knows the patient well. 

Will cancel Dr. Jorgensen consult.

## 2018-05-31 NOTE — CP.PCM.CON
Past Patient History





- Infectious Disease


Hx of Infectious Diseases: None





- Tetanus Immunizations


Tetanus Immunization: Unknown





- Past Medical History & Family History


Past Medical History?: Yes





- Past Social History


Smoking Status: Never Smoked





- CARDIAC


Hx Hypercholesterolemia: Yes


Hx Hypertension: Yes





- PULMONARY


Hx Respiratory Disorders: No





- NEUROLOGICAL


Hx Neurological Disorder: No





- HEENT


Hx HEENT Problems: No





- RENAL


Hx Chronic Kidney Disease: No





- ENDOCRINE/METABOLIC


Hx Diabetes Mellitus Type 2: Yes





- HEMATOLOGICAL/ONCOLOGICAL


Hx Blood Disorders: No


Hx Cancer: Yes (Ovarian cancer)


Hx Chemotherapy: Yes





- INTEGUMENTARY


Hx Dermatological Problems: No





- MUSCULOSKELETAL/RHEUMATOLOGICAL


Hx Musculoskeletal Disorders: No


Hx Falls: No





- GASTROINTESTINAL


Hx Gastrointestinal Disorders: No





- GENITOURINARY/GYNECOLOGICAL


Hx Genitourinary Disorders: No


Hx Ovarian Cancer: Yes





- PSYCHIATRIC


Hx Anxiety: Yes


Hx Substance Use: No





- SURGICAL HISTORY


Hx Appendectomy: Yes





- ANESTHESIA


Hx Anesthesia: Yes


Hx Anesthesia Reactions: No





Meds


Allergies/Adverse Reactions: 


 Allergies











Allergy/AdvReac Type Severity Reaction Status Date / Time


 


No Known Allergies Allergy   Verified 05/30/18 08:12














- Medications


Medications: 


 Current Medications





Aspirin (Ecotrin)  81 mg PO DAILY [facility]


Enoxaparin Sodium (Lovenox)  40 mg SC DAILY [facility]


[facility])  [facility]


   PRN Reason: Protocol


   Last Admin: 05/30/18 21:04 Dose:  Not Given


Losartan Potassium (Cozaar)  50 mg PO DAILY [facility]


Metoprolol Succinate (Toprol Xl)  100 mg PO DAILY [facility]


Rosuvastatin Calcium (Crestor)  20 mg PO [facility]


   Last Admin: 05/30/18 21:05 Dose:  20 mg


Sitagliptin Phosphate (Januvia)  100 mg PO DAILY [facility]











Results





- Vital Signs


Recent Vital Signs: 


 Last Vital Signs











Temp  98.1 F   05/31/18 07:35


 


Pulse  88   05/31/18 07:35


 


Resp  20   05/31/18 07:35


 


BP  145/86   05/31/18 07:35


 


Pulse Ox  98   05/31/18 08:34














- Labs


Result Diagrams: 


 05/30/18 08:30





 05/31/18 07:40


Labs: 


 Laboratory Results - last 24 hr











  05/30/18 05/30/18 05/30/18





  08:30 16:49 20:29


 


Sodium  140  


 


Potassium  4.0  


 


Chloride   


 


Carbon Dioxide   


 


Anion Gap  18  


 


BUN   


 


Creatinine   


 


Est GFR ( Amer)   


 


Est GFR (Non-Af Amer)   


 


POC Glucose (mg/dL)   189 H 


 


Random Glucose   


 


Calcium   


 


ALT  32  


 


Total Creatine Kinase    90


 


CK-MB (Mass)  < 0.22   0.36


 


Troponin I    < 0.0120


 


Lipase  106  














  05/30/18 05/31/18 05/31/18





  20:47 00:56 06:25


 


Sodium   


 


Potassium   


 


Chloride   


 


Carbon Dioxide   


 


Anion Gap   


 


BUN   


 


Creatinine   


 


Est GFR ( Amer)   


 


Est GFR (Non-Af Amer)   


 


POC Glucose (mg/dL)  188 H   220 H


 


Random Glucose   


 


Calcium   


 


ALT   


 


Total Creatine Kinase   62 


 


CK-MB (Mass)   0.57 


 


Troponin I   < 0.0120 


 


Lipase   














  05/31/18





  07:40


 


Sodium  139


 


Potassium  4.0


 


Chloride  103


 


Carbon Dioxide  24


 


Anion Gap  17


 


BUN  11


 


Creatinine  0.6 L


 


Est GFR ( Amer)  > 60


 


Est GFR (Non-Af Amer)  > 60


 


POC Glucose (mg/dL) 


 


Random Glucose  233 H


 


Calcium  9.0


 


ALT 


 


Total Creatine Kinase 


 


CK-MB (Mass) 


 


Troponin I 


 


Lipase

## 2018-05-31 NOTE — CARD
--------------- APPROVED REPORT --------------





EKG Measurement

Heart [id number]

NC [id number]

[id number]

[id number]

[id number]



<Conclusion>

Sinus tachycardia

Possible Left atrial enlargement

Borderline ECG

## 2018-06-01 VITALS — HEART RATE: 79 BPM

## 2018-06-01 VITALS — RESPIRATION RATE: 20 BRPM

## 2018-06-01 VITALS — OXYGEN SATURATION: 99 %

## 2018-06-01 VITALS — DIASTOLIC BLOOD PRESSURE: 79 MMHG | SYSTOLIC BLOOD PRESSURE: 121 MMHG | TEMPERATURE: 97.4 F

## 2018-06-01 RX ADMIN — INSULIN ASPART SCH UNIT: 100 INJECTION, SOLUTION INTRAVENOUS; SUBCUTANEOUS at 13:00

## 2018-06-01 RX ADMIN — METOPROLOL SUCCINATE SCH MG: 100 TABLET, EXTENDED RELEASE ORAL at 09:12

## 2018-06-01 RX ADMIN — INSULIN ASPART SCH UNIT: 100 INJECTION, SOLUTION INTRAVENOUS; SUBCUTANEOUS at 09:12

## 2018-06-01 RX ADMIN — INSULIN ASPART SCH UNIT: 100 INJECTION, SOLUTION INTRAVENOUS; SUBCUTANEOUS at 18:15

## 2018-06-01 RX ADMIN — ENOXAPARIN SODIUM SCH MG: 40 INJECTION SUBCUTANEOUS at 09:12

## 2018-06-02 NOTE — CARD
--------------- APPROVED REPORT --------------





EXAM: Two-dimensional and M-mode echocardiogram with Doppler and 

color Doppler.



Other Information 

Quality : GoodRhythm : 



INDICATION

Chest Pain Palpitations 



RISK FACTORS

Hypertension 

Diabetes



2D DIMENSIONS 

IVSd0.9   (0.7-1.1cm)LVDd3.8   (3.9-5.9cm)

PWd1.0   (0.7-1.1cm)LVDs2.6   (2.5-4.0cm)

FS (%) 31.2   %LVEF (%)59.7   (>50%)



M-Mode DIMENSIONS 

Left Atrium (MM)3.54   (2.5-4.0cm)Aortic Root2.72   (2.2-3.7cm)

Aortic Cusp Exc.1.85   (1.5-2.0cm)



Mitral Valve

MV E Iiejhwcf42.8cm/sMV A Zadmfdsf07.2cm/sE/A ratio1.0



TDI

E/Lateral E'0.0E/Medial E'0.0



Tricuspid Valve

TR Peak [id number]/sTR Peak [id number]



 LEFT VENTRICLE 

The left ventricle is normal size.

There is normal left ventricular wall thickness.

The left ventricular function is normal.

The left ventricular ejection fraction is within the normal range.

There is normal LV segmental wall motion.

The left ventricular diastolic function is normal.



 RIGHT VENTRICLE 

The right ventricle is normal size.



 ATRIA 

The left atrium size is normal.

The right atrium size is normal.



 AORTIC VALVE 

The aortic valve is normal in structure.



 MITRAL VALVE 

Mitral regurgitation is trace to mild.



 TRICUSPID VALVE 

There is trace tricuspid regurgitation.



<Conclusion>

Normal LV systolic function.

Diastolic dysfunction.

Normal chamber size.

Trace to mild MR.

Trace TR.

## 2018-06-02 NOTE — CP.PCM.DIS
Provider





- Provider


Date of Admission: 


05/30/18 10:41





Attending physician: 


Sawyer Escobar MD





Time Spent in preparation of Discharge (in minutes): 18





Diagnosis





- Discharge Diagnosis


(1) Hypertension


Status: Acute   





(2) Chest pain


Status: Acute   





(3) Abdominal pain


Status: Acute   





(4) Dizziness


Status: Acute   





(5) Headache


Status: Acute   





(6) Diabetes


Status: Acute   





Hospital Course





- Lab Results


Lab Results: 


 Most Recent Lab Values











WBC  7.3 K/uL (4.8-10.8)   05/30/18  08:30    


 


RBC  5.04 Mil/uL (3.80-5.20)   05/30/18  08:30    


 


Hgb  14.4 g/dL (11.0-16.0)   05/30/18  08:30    


 


Hct  42.6 % (34.0-47.0)   05/30/18  08:30    


 


MCV  84.6 fL (81.0-99.0)  D 05/30/18  08:30    


 


MCH  28.6 pg (27.0-31.0)   05/30/18  08:30    


 


MCHC  33.8 g/dL (33.0-37.0)   05/30/18  08:30    


 


RDW  13.3 % (11.5-14.5)   05/30/18  08:30    


 


Plt Count  242 K/uL (130-400)   05/30/18  08:30    


 


MPV  9.7 fL (7.2-11.7)   05/30/18  08:30    


 


Neut % (Auto)  62.8 % (50.0-75.0)   05/30/18  08:30    


 


Lymph % (Auto)  29.8 % (20.0-40.0)   05/30/18  08:30    


 


Mono % (Auto)  5.9 % (0.0-10.0)   05/30/18  08:30    


 


Eos % (Auto)  0.9 % (0.0-4.0)   05/30/18  08:30    


 


Baso % (Auto)  0.6 % (0.0-2.0)   05/30/18  08:30    


 


Neut # (Auto)  4.6 K/uL (1.8-7.0)   05/30/18  08:30    


 


Lymph # (Auto)  2.2 K/uL (1.0-4.3)   05/30/18  08:30    


 


Mono # (Auto)  0.4 K/uL (0.0-0.8)   05/30/18  08:30    


 


Eos # (Auto)  0.1 K/uL (0.0-0.7)   05/30/18  08:30    


 


Baso # (Auto)  0.0 K/uL (0.0-0.2)   05/30/18  08:30    


 


Sodium  139 mmol/L (132-148)   05/31/18  07:40    


 


Potassium  4.0 mmol/L (3.6-5.2)   05/31/18  07:40    


 


Chloride  103 mmol/L ()   05/31/18  07:40    


 


Carbon Dioxide  24 mmol/L (22-30)   05/31/18  07:40    


 


Anion Gap  17  (10-20)   05/31/18  07:40    


 


BUN  11 mg/dL (7-17)   05/31/18  07:40    


 


Creatinine  0.6 mg/dL (0.7-1.2)  L  05/31/18  07:40    


 


Est GFR ( Amer)  > 60   05/31/18  07:40    


 


Est GFR (Non-Af Amer)  > 60   05/31/18  07:40    


 


POC Glucose (mg/dL)  244 mg/dL ()  H  06/01/18  16:55    


 


Random Glucose  233 mg/dL ()  H  05/31/18  07:40    


 


Hemoglobin A1c  11.9 % (4.2-6.5)  H  05/31/18  11:26    


 


Calcium  9.0 mg/dl (8.6-10.4)   05/31/18  07:40    


 


Total Bilirubin  1.0 mg/dL (0.2-1.3)   05/30/18  08:30    


 


AST  42 U/L (14-36)  H  05/30/18  08:30    


 


ALT  32 U/L (9-52)   05/30/18  08:30    


 


Alkaline Phosphatase  173 U/L ()  H D 05/30/18  08:30    


 


Total Creatine Kinase  62 U/L ()   05/31/18  00:56    


 


CK-MB (Mass)  0.57 ng/mL (0.0-3.38)   05/31/18  00:56    


 


Troponin I  < 0.0120 ng/mL (0.00-0.120)   05/31/18  00:56    


 


Total Protein  7.6 g/dL (6.3-8.3)   05/30/18  08:30    


 


Albumin  4.0 g/dL (3.5-5.0)   05/30/18  08:30    


 


Globulin  3.6 gm/dL (2.2-3.9)   05/30/18  08:30    


 


Albumin/Globulin Ratio  1.1  (1.0-2.1)   05/30/18  08:30    


 


Triglycerides  214 mg/dL (0-149)  H  05/31/18  11:26    


 


Cholesterol  255 mg/dL (0-199)  H  05/31/18  11:26    


 


LDL Cholesterol Direct  173 mg/dL (0-129)  H  05/31/18  11:26    


 


HDL Cholesterol  44 mg/dL (30-70)   05/31/18  11:26    


 


Lipase  106 U/L ()   05/30/18  08:30    


 


TSH 3rd Generation  1.13 mIU/L (0.46-4.68)   05/31/18  11:26    


 


Urine Color  Yellow  (YELLOW)   05/30/18  08:41    


 


Urine Clarity  Clear  (Clear)   05/30/18  08:41    


 


Urine pH  5.0  (5.0-8.0)   05/30/18  08:41    


 


Ur Specific Gravity  1.014  (1.003-1.030)   05/30/18  08:41    


 


Urine Protein  1+ mg/dL (NEGATIVE)  H  05/30/18  08:41    


 


Urine Glucose (UA)  3+ mg/dL (Normal)  H  05/30/18  08:41    


 


Urine Ketones  Negative mg/dL (NEGATIVE)   05/30/18  08:41    


 


Urine Blood  Negative  (NEGATIVE)   05/30/18  08:41    


 


Urine Nitrate  Negative  (NEGATIVE)   05/30/18  08:41    


 


Urine Bilirubin  Negative  (NEGATIVE)   05/30/18  08:41    


 


Urine Urobilinogen  Normal mg/dL (0.2-1.0)   05/30/18  08:41    


 


Ur Leukocyte Esterase  Neg Pj/uL (Negative)   05/30/18  08:41    


 


Urine WBC (Auto)  < 1 /hpf (0-5)   05/30/18  08:41    


 


Ur Squamous Epith Cells  2 /hpf (0-5)   05/30/18  08:41    














- Hospital Course


Hospital Course: 





pt is for discharge stable, MI ruled out, chest pain is Atypical. But CAD has 

to to be considered in a diabetic patient


pt is feeling better





Discharge Exam





- Head Exam


Head Exam: NORMAL INSPECTION





- Eye Exam


Eye Exam: EOMI, Normal appearance, PERRL


Pupil Exam: NORMAL ACCOMODATION, PERRL





- ENT Exam


ENT Exam: Mucous Membranes Moist





- Respiratory Exam


Respiratory Exam: Clear to PA & Lateral





- Cardiovascular Exam


Cardiovascular Exam: REGULAR RHYTHM, +S1, +S2





- GI/Abdominal Exam


GI & Abdominal Exam: Normal Bowel Sounds





Discharge Plan





- Discharge Medications


Prescriptions: 


Olmesartan Medoxomil [Benicar] 20 mg PO DAILY 30 Days #30 tablet





- Follow Up Plan


Condition: IMPROVED


Disposition: HOME/ ROUTINE


Instructions:  Chest Pain, High Blood Pressure (DC), Diabetes Diet , Diabetes 

Type 2 (DC), Low Salt Diet, Olmesartan


Additional Instructions: 


Follow up with Dr. Escobar in one week. Return to ED if symptoms return or 

worsen. 


Referrals: 


Sawyer Escobar MD [Staff Provider] -

## 2018-09-20 ENCOUNTER — HOSPITAL ENCOUNTER (INPATIENT)
Dept: HOSPITAL 31 - C.ER | Age: 48
LOS: 11 days | Discharge: ANOTHER HOSPITAL | DRG: 552 | End: 2018-10-01
Attending: INTERNAL MEDICINE | Admitting: INTERNAL MEDICINE
Payer: COMMERCIAL

## 2018-09-20 VITALS — RESPIRATION RATE: 20 BRPM

## 2018-09-20 VITALS — BODY MASS INDEX: 31.8 KG/M2

## 2018-09-20 DIAGNOSIS — G95.29: ICD-10-CM

## 2018-09-20 DIAGNOSIS — Z85.43: ICD-10-CM

## 2018-09-20 DIAGNOSIS — Z79.4: ICD-10-CM

## 2018-09-20 DIAGNOSIS — E11.65: ICD-10-CM

## 2018-09-20 DIAGNOSIS — I47.1: ICD-10-CM

## 2018-09-20 DIAGNOSIS — M50.021: Primary | ICD-10-CM

## 2018-09-20 DIAGNOSIS — E66.9: ICD-10-CM

## 2018-09-20 DIAGNOSIS — R20.2: ICD-10-CM

## 2018-09-20 DIAGNOSIS — R29.2: ICD-10-CM

## 2018-09-20 DIAGNOSIS — T38.0X5A: ICD-10-CM

## 2018-09-20 DIAGNOSIS — F40.240: ICD-10-CM

## 2018-09-20 DIAGNOSIS — E78.5: ICD-10-CM

## 2018-09-20 DIAGNOSIS — I10: ICD-10-CM

## 2018-09-20 DIAGNOSIS — R53.1: ICD-10-CM

## 2018-09-20 LAB
ALBUMIN SERPL-MCNC: 4.7 G/DL (ref 3.5–5)
ALBUMIN/GLOB SERPL: 1.2 {RATIO} (ref 1–2.1)
ALT SERPL-CCNC: 31 U/L (ref 9–52)
APTT BLD: 33 SECONDS (ref 21–34)
AST SERPL-CCNC: 36 U/L (ref 14–36)
BASOPHILS # BLD AUTO: 0.1 K/UL (ref 0–0.2)
BASOPHILS NFR BLD: 0.7 % (ref 0–2)
BILIRUB UR-MCNC: NEGATIVE MG/DL
BUN SERPL-MCNC: 19 MG/DL (ref 7–17)
CALCIUM SERPL-MCNC: 10 MG/DL (ref 8.6–10.4)
CK MB SERPL-MCNC: 1.14 NG/ML (ref 0–3.38)
CK MB SERPL-MCNC: 1.32 NG/ML (ref 0–3.38)
D DIMER: 224 NG/MLDDU (ref 0–243)
EOSINOPHIL # BLD AUTO: 0.1 K/UL (ref 0–0.7)
EOSINOPHIL NFR BLD: 0.9 % (ref 0–4)
ERYTHROCYTE [DISTWIDTH] IN BLOOD BY AUTOMATED COUNT: 13.3 % (ref 11.5–14.5)
GFR NON-AFRICAN AMERICAN: > 60
GLUCOSE UR STRIP-MCNC: NORMAL MG/DL
HCG,QUALITATIVE URINE: NEGATIVE
HGB BLD-MCNC: 13.8 G/DL (ref 11–16)
INR PPP: 1
LEUKOCYTE ESTERASE UR-ACNC: (no result) LEU/UL
LYMPHOCYTES # BLD AUTO: 2.7 K/UL (ref 1–4.3)
LYMPHOCYTES NFR BLD AUTO: 35 % (ref 20–40)
MCH RBC QN AUTO: 27.6 PG (ref 27–31)
MCHC RBC AUTO-ENTMCNC: 33.4 G/DL (ref 33–37)
MCV RBC AUTO: 82.5 FL (ref 81–99)
MONOCYTES # BLD: 0.5 K/UL (ref 0–0.8)
MONOCYTES NFR BLD: 6.5 % (ref 0–10)
NEUTROPHILS # BLD: 4.5 K/UL (ref 1.8–7)
NEUTROPHILS NFR BLD AUTO: 56.9 % (ref 50–75)
NRBC BLD AUTO-RTO: 0 % (ref 0–2)
PH UR STRIP: 5 [PH] (ref 5–8)
PLATELET # BLD: 244 K/UL (ref 130–400)
PMV BLD AUTO: 9.9 FL (ref 7.2–11.7)
PROT UR STRIP-MCNC: NEGATIVE MG/DL
PROTHROMBIN TIME: 11.4 SECONDS (ref 9.7–12.2)
RBC # BLD AUTO: 5.02 MIL/UL (ref 3.8–5.2)
RBC # UR STRIP: NEGATIVE /UL
SP GR UR STRIP: 1.02 (ref 1–1.03)
SQUAMOUS EPITHIAL: 6 /HPF (ref 0–5)
UROBILINOGEN UR-MCNC: NORMAL MG/DL (ref 0.2–1)
WBC # BLD AUTO: 7.8 K/UL (ref 4.8–10.8)

## 2018-09-20 RX ADMIN — INSULIN ASPART SCH: 100 INJECTION, SOLUTION INTRAVENOUS; SUBCUTANEOUS at 22:49

## 2018-09-20 RX ADMIN — ENOXAPARIN SODIUM SCH MG: 40 INJECTION SUBCUTANEOUS at 09:56

## 2018-09-20 RX ADMIN — INSULIN ASPART SCH: 100 INJECTION, SOLUTION INTRAVENOUS; SUBCUTANEOUS at 18:53

## 2018-09-20 RX ADMIN — INSULIN ASPART SCH U: 100 INJECTION, SOLUTION INTRAVENOUS; SUBCUTANEOUS at 11:51

## 2018-09-20 NOTE — RAD
Date of service: 



09/20/2018



HISTORY:

 chest pain 



COMPARISON:

Portable chest 09/20/2018 3:45 a.m..



TECHNIQUE:

Chest PA and lateral



FINDINGS:



LUNGS:

No active pulmonary disease.



PLEURA:

No significant pleural effusion identified. No pneumothorax apparent.



CARDIOVASCULAR:

Normal.



OSSEOUS STRUCTURES:

No significant abnormalities.



VISUALIZED UPPER ABDOMEN:

Normal.



OTHER FINDINGS:

None.



IMPRESSION:

No acute infiltrate bilaterally with left base clear in the interval. 

 No pulmonary vascular congestion.

## 2018-09-20 NOTE — C.PDOC
History Of Present Illness


48-year-old female, whose PMHx includes DM, HTN, Hyperlipidemia, and SVT, 

presents to the ED for evaluation of chest pain which began at around 0100 

today. Patient also reports paresthesia to her bilateral hands. Patient has 

been evaluated by a neurologist in the past, had an EMG test done, and is due 

for an outpatient MRI. According to previous records, patient underwent a 

stress test three years ago, which was normal. She denies any other complaints 

at this time. 


Time Seen by Provider: 18 03:06


Chief Complaint (Nursing): Chest Pain


History Per: Patient


History/Exam Limitations: no limitations


Onset/Duration Of Symptoms: Hrs


Current Symptoms Are (Timing): Still Present


Quality: "Pain"


Additional History Per: Patient





Past Medical History


Reviewed: Historical Data, Nursing Documentation, Vital Signs


Vital Signs: 


 Last Vital Signs











Temp  97.6 F   18 07:00


 


Pulse  88   18 10:00


 


Resp  20   18 07:00


 


BP  120/81   18 10:00


 


Pulse Ox  100   18 07:00














- Medical History


PMH: Anxiety, Cardia Arrhythmia (Paroxysmal SVT.), Diabetes, HTN, 

Hypercholesterolemia, Malignancy (Ovarian CA)


   Denies: Chronic Kidney Disease


Surgical History: Appendectomy


Family History: States: Unknown Family Hx





- Social History


Hx Tobacco Use: No


Hx Alcohol Use: No


Hx Substance Use: No





- Immunization History


Hx Tetanus Toxoid Vaccination: No


Hx Influenza Vaccination: Yes


Hx Pneumococcal Vaccination: No





Review Of Systems


Cardiovascular: Positive for: Chest Pain


Respiratory: Negative for: Shortness of Breath


Neurological: Positive for: Other (paresthesia to bilateral hands )





Physical Exam





- Physical Exam


Appears: Non-toxic, No Acute Distress, Other (mildly anxious )


Skin: Normal Color, Warm, Dry


Head: Atraumatic, Normacephalic


Eye(s): bilateral: Normal Inspection


Oral Mucosa: Moist


Neck: Supple


Chest: Symmetrical, No Deformity, No Tenderness


Cardiovascular: Rhythm Regular, No Murmur, Other (tachycardic )


Respiratory: Normal Breath Sounds, No Rales, No Rhonchi, No Wheezing


Extremity: Normal ROM, Capillary Refill (less than 2 seconds )


Neurological/Psych: Oriented x3, Normal Speech, Normal Cognition





ED Course And Treatment





- Laboratory Results


Result Diagrams: 


 18 07:40





 18 07:40


ECG: Interpreted By Me, Viewed By Me


ECG Rhythm: Sinus Tachycardia


Interpretation Of ECG: Sinus Tachycardia at rate 122bpm.


Rate From EC


O2 Sat by Pulse Oximetry: 96 (on RA )


Pulse Ox Interpretation: Normal





Medical Decision Making


Medical Decision Making: 


cp r/o acs. also reports b/l arm numbness s/p outpt neurology eval.





Progress: 


Bloodwork, urinalysis, CXR, and EKG ordered and reviewed. 


Ativan IVP given. 





labs neg. case discussed with dr peterson accepts





Disposition





- Disposition


Disposition: HOSPITALIZED


Disposition Time: 10:00


Condition: STABLE





- Clinical Impression


Clinical Impression: 


 Chest pain








- Scribe Statement


The provider has reviewed the documentation as recorded by the Scribe (Krystal Lomeli)


Provider Attestation: 








All medical record entries made by the Scribe were at my direction and 

personally dictated by me. I have reviewed the chart and agree that the record 

accurately reflects my personal performance of the history, physical exam, 

medical decision making, and the department course for this patient. I have 

also personally directed, reviewed, and agree with the discharge instructions 

and disposition.





Decision To Admit





- Pt Status Changed To:


Hospital Disposition Of: Observation





- .


Bed Request Type: Telemetry


Admitting Physician: Jose Antonio Peterson


Patient Diagnosis: 


 Chest pain

## 2018-09-20 NOTE — CP.PCM.HP
History of Present Illness





- History of Present Illness


History of Present Illness: 





48 years old female woke up early this AM complaining of anterior chest 

tightness, left sided low back pain and numbness of both hands. She had fallen 

from a stair at home a few days ago and hit her head, and back to the floor. 

She was also seen by a Neurologist in [facility], who told her that she has pinched 

nerve in the neck and had orderded an MRI of the cervical spine. She denies any 

shortness of breath, palpitation, sweating, nausea. She is known to have an IDDM

, a HPTN, an obesity, a Hx of SVT and ovarian cancer. An echocardiogram done at 

 in 6/2018 revealed normal LV systolic function. A stress MPI 3 years ago was 

normal. Her CXR is normal. ECG reveals a sinus tachycardia, otherwise, WNL. 

Serum TNI x 2 are normal.





Present on Admission





- Present on Admission


Any Indicators Present on Admission: No





Review of Systems





- Cardiovascular


Cardiovascular: Chest Pain at Rest





- Musculoskeletal


Additional comments: 





Numbness of both hands.





Past Patient History





- Infectious Disease


Hx of Infectious Diseases: None





- Tetanus Immunizations


Tetanus Immunization: Unknown





- Past Medical History & Family History


Past Medical History?: Yes





- Past Social History


Smoking Status: Never Smoked


Alcohol: None


Drugs: Denies


Home Situation {Lives}: With Family


Domestic Violence: Negative





- CARDIAC


Hx Cardia Arrhythmia: Yes (Paroxysmal SVT.)


Hx Hypercholesterolemia: Yes


Hx Hypertension: Yes





- PULMONARY


Hx Respiratory Disorders: No





- NEUROLOGICAL


Hx Neurological Disorder: No


Other/Comment: new onset of numbness in arms and legs.





- HEENT


Hx HEENT Problems: No





- RENAL


Hx Chronic Kidney Disease: No





- ENDOCRINE/METABOLIC


Hx Endocrine Disorders: Yes


Hx Diabetes Mellitus Type 2: Yes





- HEMATOLOGICAL/ONCOLOGICAL


Hx Blood Disorders: No


Hx Cancer: Yes (Ovarian cancer)


Hx Chemotherapy: Yes





- INTEGUMENTARY


Hx Dermatological Problems: No





- MUSCULOSKELETAL/RHEUMATOLOGICAL


Hx Musculoskeletal Disorders: No


Hx Falls: Yes





- GASTROINTESTINAL


Hx Gastrointestinal Disorders: No





- GENITOURINARY/GYNECOLOGICAL


Hx Genitourinary Disorders: Yes (ovarian ca)





- PSYCHIATRIC


Hx Anxiety: Yes


Hx Substance Use: No





- SURGICAL HISTORY


Hx Appendectomy: Yes





- ANESTHESIA


Hx Anesthesia: Yes


Hx Anesthesia Reactions: No





Meds


Allergies/Adverse Reactions: 


 Allergies











Allergy/AdvReac Type Severity Reaction Status Date / Time


 


No Known Allergies Allergy   Verified 05/30/18 08:12














Physical Exam





- Constitutional


Appears: Well, No Acute Distress





- Head Exam


Head Exam: NORMAL INSPECTION





- Eye Exam


Eye Exam: Normal appearance


Pupil Exam: NORMAL ACCOMODATION





- ENT Exam


ENT Exam: Normal Exam





- Neck Exam


Neck exam: Positive for: Normal Inspection





- Respiratory Exam


Respiratory Exam: Clear to Auscultation Bilateral, NORMAL BREATHING PATTERN





- Cardiovascular Exam


Cardiovascular Exam: REGULAR RHYTHM





- GI/Abdominal Exam


GI & Abdominal Exam: Normal Bowel Sounds, Soft





- Rectal Exam


Rectal Exam: Deferred





- Extremities Exam


Additional comments: 





Numbness of the hands.





- Back Exam


Additional comments: 





Mild tenderness of the lower back. No bruise seen.





- Neurological Exam


Neurological exam: Alert, Oriented x3





- Psychiatric Exam


Psychiatric exam: Anxious





- Skin


Skin Exam: Dry, Intact, Normal Color, Warm





Results





- Vital Signs


Recent Vital Signs: 





 Last Vital Signs











Temp  98.9 F   09/20/18 16:00


 


Pulse  85   09/20/18 16:00


 


Resp  20   09/20/18 16:00


 


BP  132/85   09/20/18 16:00


 


Pulse Ox  100   09/20/18 16:00














- Labs


Result Diagrams: 


 09/20/18 03:58





 09/20/18 03:58


Labs: 





 Laboratory Results - last 24 hr











  09/20/18 09/20/18 09/20/18





  02:55 03:13 03:58


 


WBC    7.8


 


RBC    5.02


 


Hgb    13.8


 


Hct    41.4


 


MCV    82.5  D


 


MCH    27.6


 


MCHC    33.4


 


RDW    13.3


 


Plt Count    244


 


MPV    9.9


 


Neut % (Auto)    56.9


 


Lymph % (Auto)    35.0


 


Mono % (Auto)    6.5


 


Eos % (Auto)    0.9


 


Baso % (Auto)    0.7


 


Neut # (Auto)    4.5


 


Lymph # (Auto)    2.7


 


Mono # (Auto)    0.5


 


Eos # (Auto)    0.1


 


Baso # (Auto)    0.1


 


PT   


 


INR   


 


APTT   


 


D-Dimer, Quantitative   


 


Sodium   


 


Potassium   


 


Chloride   


 


Carbon Dioxide   


 


Anion Gap   


 


BUN   


 


Creatinine   


 


Est GFR ( Amer)   


 


Est GFR (Non-Af Amer)   


 


POC Glucose (mg/dL)  178 H  


 


Random Glucose   


 


Calcium   


 


Magnesium   


 


Total Bilirubin   


 


AST   


 


ALT   


 


Alkaline Phosphatase   


 


Total Creatine Kinase   


 


CK-MB (Mass)   


 


Troponin I   


 


Total Protein   


 


Albumin   


 


Globulin   


 


Albumin/Globulin Ratio   


 


Urine Color   Yellow 


 


Urine Clarity   Hazy 


 


Urine pH   5.0 


 


Ur Specific Gravity   1.020 


 


Urine Protein   Negative 


 


Urine Glucose (UA)   Normal 


 


Urine Ketones   Trace 


 


Urine Blood   Negative 


 


Urine Nitrate   Negative 


 


Urine Bilirubin   Negative 


 


Urine Urobilinogen   Normal 


 


Ur Leukocyte Esterase   Neg 


 


Urine WBC (Auto)   3 


 


Urine RBC (Auto)   < 1 


 


Ur Squamous Epith Cells   6 H 


 


Urine HCG, Qual   Negative 














  09/20/18 09/20/18 09/20/18





  03:58 03:58 04:46


 


WBC   


 


RBC   


 


Hgb   


 


Hct   


 


MCV   


 


MCH   


 


MCHC   


 


RDW   


 


Plt Count   


 


MPV   


 


Neut % (Auto)   


 


Lymph % (Auto)   


 


Mono % (Auto)   


 


Eos % (Auto)   


 


Baso % (Auto)   


 


Neut # (Auto)   


 


Lymph # (Auto)   


 


Mono # (Auto)   


 


Eos # (Auto)   


 


Baso # (Auto)   


 


PT  11.4  


 


INR  1.0  


 


APTT  33  


 


D-Dimer, Quantitative  224  


 


Sodium   141 


 


Potassium   4.3 


 


Chloride   104 


 


Carbon Dioxide   22 


 


Anion Gap   19 


 


BUN   19 H 


 


Creatinine   0.7 


 


Est GFR ( Amer)   > 60 


 


Est GFR (Non-Af Amer)   > 60 


 


POC Glucose (mg/dL)   


 


Random Glucose   188 H 


 


Calcium   10.0 


 


Magnesium    2.0


 


Total Bilirubin   1.0 


 


AST   36 


 


ALT   31 


 


Alkaline Phosphatase   138 H D 


 


Total Creatine Kinase   


 


CK-MB (Mass)   


 


Troponin I   < 0.0120 


 


Total Protein   8.5 H 


 


Albumin   4.7 


 


Globulin   3.8 


 


Albumin/Globulin Ratio   1.2 


 


Urine Color   


 


Urine Clarity   


 


Urine pH   


 


Ur Specific Gravity   


 


Urine Protein   


 


Urine Glucose (UA)   


 


Urine Ketones   


 


Urine Blood   


 


Urine Nitrate   


 


Urine Bilirubin   


 


Urine Urobilinogen   


 


Ur Leukocyte Esterase   


 


Urine WBC (Auto)   


 


Urine RBC (Auto)   


 


Ur Squamous Epith Cells   


 


Urine HCG, Qual   














  09/20/18 09/20/18 09/20/18





  07:05 11:09 15:51


 


WBC   


 


RBC   


 


Hgb   


 


Hct   


 


MCV   


 


MCH   


 


MCHC   


 


RDW   


 


Plt Count   


 


MPV   


 


Neut % (Auto)   


 


Lymph % (Auto)   


 


Mono % (Auto)   


 


Eos % (Auto)   


 


Baso % (Auto)   


 


Neut # (Auto)   


 


Lymph # (Auto)   


 


Mono # (Auto)   


 


Eos # (Auto)   


 


Baso # (Auto)   


 


PT   


 


INR   


 


APTT   


 


D-Dimer, Quantitative   


 


Sodium   


 


Potassium   


 


Chloride   


 


Carbon Dioxide   


 


Anion Gap   


 


BUN   


 


Creatinine   


 


Est GFR ( Amer)   


 


Est GFR (Non-Af Amer)   


 


POC Glucose (mg/dL)  157 H  197 H 


 


Random Glucose   


 


Calcium   


 


Magnesium   


 


Total Bilirubin   


 


AST   


 


ALT   


 


Alkaline Phosphatase   


 


Total Creatine Kinase    230 H


 


CK-MB (Mass)    1.32


 


Troponin I    < 0.0120


 


Total Protein   


 


Albumin   


 


Globulin   


 


Albumin/Globulin Ratio   


 


Urine Color   


 


Urine Clarity   


 


Urine pH   


 


Ur Specific Gravity   


 


Urine Protein   


 


Urine Glucose (UA)   


 


Urine Ketones   


 


Urine Blood   


 


Urine Nitrate   


 


Urine Bilirubin   


 


Urine Urobilinogen   


 


Ur Leukocyte Esterase   


 


Urine WBC (Auto)   


 


Urine RBC (Auto)   


 


Ur Squamous Epith Cells   


 


Urine HCG, Qual   














  09/20/18 09/20/18 09/20/18





  17:20 21:11 21:43


 


WBC   


 


RBC   


 


Hgb   


 


Hct   


 


MCV   


 


MCH   


 


MCHC   


 


RDW   


 


Plt Count   


 


MPV   


 


Neut % (Auto)   


 


Lymph % (Auto)   


 


Mono % (Auto)   


 


Eos % (Auto)   


 


Baso % (Auto)   


 


Neut # (Auto)   


 


Lymph # (Auto)   


 


Mono # (Auto)   


 


Eos # (Auto)   


 


Baso # (Auto)   


 


PT   


 


INR   


 


APTT   


 


D-Dimer, Quantitative   


 


Sodium   


 


Potassium   


 


Chloride   


 


Carbon Dioxide   


 


Anion Gap   


 


BUN   


 


Creatinine   


 


Est GFR ( Amer)   


 


Est GFR (Non-Af Amer)   


 


POC Glucose (mg/dL)  97  92 


 


Random Glucose   


 


Calcium   


 


Magnesium   


 


Total Bilirubin   


 


AST   


 


ALT   


 


Alkaline Phosphatase   


 


Total Creatine Kinase    214 H


 


CK-MB (Mass)    1.14


 


Troponin I    < 0.0120


 


Total Protein   


 


Albumin   


 


Globulin   


 


Albumin/Globulin Ratio   


 


Urine Color   


 


Urine Clarity   


 


Urine pH   


 


Ur Specific Gravity   


 


Urine Protein   


 


Urine Glucose (UA)   


 


Urine Ketones   


 


Urine Blood   


 


Urine Nitrate   


 


Urine Bilirubin   


 


Urine Urobilinogen   


 


Ur Leukocyte Esterase   


 


Urine WBC (Auto)   


 


Urine RBC (Auto)   


 


Ur Squamous Epith Cells   


 


Urine HCG, Qual   














Assessment & Plan


(1) Chest pain


Assessment and Plan: 


Sofar, no ECG change and normal TNI's. For outpatient stress test,


Status: Acute   





(2) Numbness and tingling in both hands


Assessment and Plan: 


R/ocervical radiculopathy. PAtient refuses to have a close MRI of the neck, 

because she is claustrophobic. Will try Neurontin 300 mg PO HS.


Status: Acute   





(3) Low back pain


Assessment and Plan: 


robably due to contusion. Will order back XRay, and try NAproxen.


Status: Acute   





Decision To Admit





- Pt Status Changed To:


Hospital Disposition Of: Observation





- .


Bed Request Type: Telemetry


Admitting Physician: Jose Antonio Peterson

## 2018-09-20 NOTE — RAD
Date of service: 



09/20/2018



PROCEDURE:  CHEST RADIOGRAPH, 1 VIEW



HISTORY:

chest pain



COMPARISON:

5/30/2018



FINDINGS:



LUNGS:

Techniques and projection very different



Current lung volumes much less than before.  Current projection 

portable and apical lordotic.



PLEURA:

No pneumothorax evaluation for small pleural effusions very limited 

given technique and large body habitus. 



CARDIOVASCULAR:

Normal.



OSSEOUS STRUCTURES:

Bilateral shoulder arthrosis.



VISUALIZED UPPER ABDOMEN:

Normal.



OTHER FINDINGS:

None. 



IMPRESSION:

Limited exam.  Of the shallow lung volumes perceived no mid or upper 

lobe pathology seen.  Especially limited evaluation of each 

costophrenic angle and left lung base.  The increased soft tissue 

density left lung base may be summation of soft tissues.  Consider PA 

with lateral with greater inspiration when patient can cooperate

## 2018-09-21 LAB
ALBUMIN SERPL-MCNC: 4.6 G/DL (ref 3.5–5)
ALBUMIN/GLOB SERPL: 1.3 {RATIO} (ref 1–2.1)
ALT SERPL-CCNC: 37 U/L (ref 9–52)
AST SERPL-CCNC: 40 U/L (ref 14–36)
BASOPHILS # BLD AUTO: 0 K/UL (ref 0–0.2)
BASOPHILS NFR BLD: 0.4 % (ref 0–2)
BUN SERPL-MCNC: 19 MG/DL (ref 7–17)
CALCIUM SERPL-MCNC: 9.9 MG/DL (ref 8.6–10.4)
EOSINOPHIL # BLD AUTO: 0.1 K/UL (ref 0–0.7)
EOSINOPHIL NFR BLD: 2 % (ref 0–4)
ERYTHROCYTE [DISTWIDTH] IN BLOOD BY AUTOMATED COUNT: 13.5 % (ref 11.5–14.5)
ERYTHROCYTE [SEDIMENTATION RATE] IN BLOOD: 18 MM/HR (ref 0–20)
GFR NON-AFRICAN AMERICAN: > 60
HGB BLD-MCNC: 14.7 G/DL (ref 11–16)
LYMPHOCYTES # BLD AUTO: 2.9 K/UL (ref 1–4.3)
LYMPHOCYTES NFR BLD AUTO: 44.7 % (ref 20–40)
MCH RBC QN AUTO: 28.1 PG (ref 27–31)
MCHC RBC AUTO-ENTMCNC: 34.1 G/DL (ref 33–37)
MCV RBC AUTO: 82.5 FL (ref 81–99)
MONOCYTES # BLD: 0.5 K/UL (ref 0–0.8)
MONOCYTES NFR BLD: 7.7 % (ref 0–10)
NEUTROPHILS # BLD: 2.9 K/UL (ref 1.8–7)
NEUTROPHILS NFR BLD AUTO: 45.2 % (ref 50–75)
NRBC BLD AUTO-RTO: 0.1 % (ref 0–2)
PLATELET # BLD: 251 K/UL (ref 130–400)
PMV BLD AUTO: 9.6 FL (ref 7.2–11.7)
PROLACTIN SERPL-MCNC: 14.3 NG/ML (ref 3–18.9)
RBC # BLD AUTO: 5.25 MIL/UL (ref 3.8–5.2)
T4 FREE SERPL-MCNC: 0.9 NG/DL (ref 0.78–2.19)
WBC # BLD AUTO: 6.5 K/UL (ref 4.8–10.8)

## 2018-09-21 RX ADMIN — INSULIN ASPART SCH: 100 INJECTION, SOLUTION INTRAVENOUS; SUBCUTANEOUS at 08:18

## 2018-09-21 RX ADMIN — INSULIN ASPART SCH: 100 INJECTION, SOLUTION INTRAVENOUS; SUBCUTANEOUS at 21:09

## 2018-09-21 RX ADMIN — INSULIN ASPART SCH U: 100 INJECTION, SOLUTION INTRAVENOUS; SUBCUTANEOUS at 13:07

## 2018-09-21 RX ADMIN — NAPROXEN SODIUM SCH MG: 550 TABLET ORAL at 19:50

## 2018-09-21 RX ADMIN — INSULIN ASPART SCH: 100 INJECTION, SOLUTION INTRAVENOUS; SUBCUTANEOUS at 19:48

## 2018-09-21 RX ADMIN — ENOXAPARIN SODIUM SCH MG: 40 INJECTION SUBCUTANEOUS at 10:37

## 2018-09-21 NOTE — CP.PCM.CON
History of Present Illness





- History of Present Illness


History of Present Illness: 





Neurology Consultation Note:





Mrs. Cortés is a 48-year-old woman with a past medical history of ovarian cancer

, DM, HTN, HLD, and thyroid nodules, who presented to the ED complaining of 

chest pain as well as a two week duration of progressive lower extremity 

weakness, stiffness and loss of dexterity and mobility in her upper 

extremities.  She has neck pain, back pain and pain in her hips and knees.  She 

had a fall about a week ago due to the weakness, fell backward and hit her 

head.  Due to the constellation of symptoms, she was sent to a neurologist, Dr. Brar, who performed an EMG/NCS and did not find any peripheral neuropathy, 

according to the patient.  She requested an MRI of the cervical spine, but the 

patient is claustrophobic.  





Review of Systems





- Review of Systems


All systems: reviewed and no additional remarkable complaints except





Past Patient History





- Infectious Disease


Hx of Infectious Diseases: None





- Tetanus Immunizations


Tetanus Immunization: Unknown





- Past Medical History & Family History


Past Medical History?: Yes





- Past Social History


Smoking Status: Never Smoked


Alcohol: None


Drugs: Denies


Home Situation {Lives}: With Family


Domestic Violence: Negative





- CARDIAC


Hx Cardia Arrhythmia: Yes (Paroxysmal SVT.)


Hx Hypercholesterolemia: Yes


Hx Hypertension: Yes





- PULMONARY


Hx Respiratory Disorders: No





- NEUROLOGICAL


Hx Neurological Disorder: No


Other/Comment: new onset of numbness in arms and legs.





- HEENT


Hx HEENT Problems: No





- RENAL


Hx Chronic Kidney Disease: No





- ENDOCRINE/METABOLIC


Hx Endocrine Disorders: Yes


Hx Diabetes Mellitus Type 2: Yes





- HEMATOLOGICAL/ONCOLOGICAL


Hx Blood Disorders: No


Hx Cancer: Yes (Ovarian cancer)


Hx Chemotherapy: Yes





- INTEGUMENTARY


Hx Dermatological Problems: No





- MUSCULOSKELETAL/RHEUMATOLOGICAL


Hx Musculoskeletal Disorders: No


Hx Falls: Yes





- GASTROINTESTINAL


Hx Gastrointestinal Disorders: No





- GENITOURINARY/GYNECOLOGICAL


Hx Genitourinary Disorders: Yes (ovarian ca)





- PSYCHIATRIC


Hx Anxiety: Yes


Hx Substance Use: No





- SURGICAL HISTORY


Hx Appendectomy: Yes





- ANESTHESIA


Hx Anesthesia: Yes


Hx Anesthesia Reactions: No





Meds


Allergies/Adverse Reactions: 


 Allergies











Allergy/AdvReac Type Severity Reaction Status Date / Time


 


No Known Allergies Allergy   Verified 05/30/18 08:12














- Medications


Medications: 


 Current Medications





Acetaminophen (Tylenol 325mg Tab)  650 mg PO Q6 PRN


   PRN Reason: Pain, moderate (4-7)


Enoxaparin Sodium (Lovenox)  40 mg SC DAILY [facility]


   Last Admin: 09/21/18 10:37 Dose:  40 mg


Insulin Aspart (Novolog)  [facility]


   PRN Reason: Protocol


   Last Admin: 09/21/18 13:07 Dose:  2 u


Losartan Potassium (Cozaar)  50 mg PO DAILY [facility]


   Last Admin: 09/21/18 10:37 Dose:  50 mg


Metoprolol Tartrate (Lopressor)  100 mg PO DAILY [facility]


   Last Admin: 09/21/18 10:37 Dose:  100 mg


Rosuvastatin Calcium (Crestor)  20 mg PO HS [facility]


   Last Admin: 09/20/18 22:49 Dose:  20 mg


Sitagliptin Phosphate (Januvia)  100 mg PO DAILY [facility]


   Last Admin: 09/21/18 10:37 Dose:  100 mg











Physical Exam





- Neurological Exam


Additional comments: 





Awake, alert, oriented X3, fluent speech, no dysarthria, mental status is normal

, mood/affect normal, notable generalized weakness with an shuffling pain-

limited gait.  Strength is less distally than proximally in the upper 

extremities 3/5 hand  and 2/5 finger strength on abduction/adduction.  

Right hip flexion is 3/5 as compared with the left, which was 4/5.  Reflexes 

are brisk.  Plantar responses are normal.  Sensation is intact.  Romberg not 

tested.





Results





- Vital Signs


Recent Vital Signs: 


 Last Vital Signs











Temp  98.7 F   09/21/18 08:00


 


Pulse  88   09/21/18 10:36


 


Resp  20   09/21/18 08:00


 


BP  124/81   09/21/18 10:36


 


Pulse Ox  99   09/21/18 11:21














- Labs


Result Diagrams: 


 09/21/18 07:01





 09/21/18 07:01


Labs: 


 Laboratory Results - last 24 hr











  09/20/18 09/20/18 09/20/18





  15:51 17:20 21:11


 


WBC   


 


RBC   


 


Hgb   


 


Hct   


 


MCV   


 


MCH   


 


MCHC   


 


RDW   


 


Plt Count   


 


MPV   


 


Neut % (Auto)   


 


Lymph % (Auto)   


 


Mono % (Auto)   


 


Eos % (Auto)   


 


Baso % (Auto)   


 


Neut # (Auto)   


 


Lymph # (Auto)   


 


Mono # (Auto)   


 


Eos # (Auto)   


 


Baso # (Auto)   


 


ESR   


 


Sodium   


 


Potassium   


 


Chloride   


 


Carbon Dioxide   


 


Anion Gap   


 


BUN   


 


Creatinine   


 


Est GFR ( Amer)   


 


Est GFR (Non-Af Amer)   


 


POC Glucose (mg/dL)   97  92


 


Random Glucose   


 


Hemoglobin A1c   


 


Calcium   


 


Total Bilirubin   


 


AST   


 


ALT   


 


Alkaline Phosphatase   


 


Total Creatine Kinase  230 H  


 


CK-MB (Mass)  1.32  


 


Troponin I  < 0.0120  


 


C-Reactive Protein   


 


Total Protein   


 


Albumin   


 


Globulin   


 


Albumin/Globulin Ratio   


 


Free T4   


 


Free T3 pg/mL   


 


TSH 3rd Generation   


 


Prolactin   














  09/20/18 09/21/18 09/21/18





  21:43 06:21 07:01


 


WBC   


 


RBC   


 


Hgb   


 


Hct   


 


MCV   


 


MCH   


 


MCHC   


 


RDW   


 


Plt Count   


 


MPV   


 


Neut % (Auto)   


 


Lymph % (Auto)   


 


Mono % (Auto)   


 


Eos % (Auto)   


 


Baso % (Auto)   


 


Neut # (Auto)   


 


Lymph # (Auto)   


 


Mono # (Auto)   


 


Eos # (Auto)   


 


Baso # (Auto)   


 


ESR   


 


Sodium   


 


Potassium   


 


Chloride   


 


Carbon Dioxide   


 


Anion Gap   


 


BUN   


 


Creatinine   


 


Est GFR ( Amer)   


 


Est GFR (Non-Af Amer)   


 


POC Glucose (mg/dL)   142 H 


 


Random Glucose   


 


Hemoglobin A1c    11.7 H


 


Calcium   


 


Total Bilirubin   


 


AST   


 


ALT   


 


Alkaline Phosphatase   


 


Total Creatine Kinase  214 H  


 


CK-MB (Mass)  1.14  


 


Troponin I  < 0.0120  


 


C-Reactive Protein   


 


Total Protein   


 


Albumin   


 


Globulin   


 


Albumin/Globulin Ratio   


 


Free T4   


 


Free T3 pg/mL   


 


TSH 3rd Generation   


 


Prolactin   














  09/21/18 09/21/18 09/21/18





  07:01 07:01 07:01


 


WBC   6.5 


 


RBC   5.25 H 


 


Hgb   14.7 


 


Hct   43.3 


 


MCV   82.5 


 


MCH   28.1 


 


MCHC   34.1 


 


RDW   13.5 


 


Plt Count   251 


 


MPV   9.6 


 


Neut % (Auto)   45.2 L 


 


Lymph % (Auto)   44.7 H 


 


Mono % (Auto)   7.7 


 


Eos % (Auto)   2.0 


 


Baso % (Auto)   0.4 


 


Neut # (Auto)   2.9 


 


Lymph # (Auto)   2.9 


 


Mono # (Auto)   0.5 


 


Eos # (Auto)   0.1 


 


Baso # (Auto)   0.0 


 


ESR   18 


 


Sodium    141


 


Potassium    4.0


 


Chloride    103


 


Carbon Dioxide    24


 


Anion Gap    18


 


BUN    19 H


 


Creatinine    0.8


 


Est GFR ( Amer)    > 60


 


Est GFR (Non-Af Amer)    > 60


 


POC Glucose (mg/dL)   


 


Random Glucose    129 H


 


Hemoglobin A1c   


 


Calcium    9.9


 


Total Bilirubin    0.7


 


AST    40 H


 


ALT    37


 


Alkaline Phosphatase    114


 


Total Creatine Kinase    163 H


 


CK-MB (Mass)   


 


Troponin I   


 


C-Reactive Protein    5.70


 


Total Protein    8.1


 


Albumin    4.6


 


Globulin    3.6


 


Albumin/Globulin Ratio    1.3


 


Free T4  0.90  


 


Free T3 pg/mL    2.81


 


TSH 3rd Generation  1.87  


 


Prolactin    14.3














  09/21/18





  11:56


 


WBC 


 


RBC 


 


Hgb 


 


Hct 


 


MCV 


 


MCH 


 


MCHC 


 


RDW 


 


Plt Count 


 


MPV 


 


Neut % (Auto) 


 


Lymph % (Auto) 


 


Mono % (Auto) 


 


Eos % (Auto) 


 


Baso % (Auto) 


 


Neut # (Auto) 


 


Lymph # (Auto) 


 


Mono # (Auto) 


 


Eos # (Auto) 


 


Baso # (Auto) 


 


ESR 


 


Sodium 


 


Potassium 


 


Chloride 


 


Carbon Dioxide 


 


Anion Gap 


 


BUN 


 


Creatinine 


 


Est GFR ( Amer) 


 


Est GFR (Non-Af Amer) 


 


POC Glucose (mg/dL)  191 H


 


Random Glucose 


 


Hemoglobin A1c 


 


Calcium 


 


Total Bilirubin 


 


AST 


 


ALT 


 


Alkaline Phosphatase 


 


Total Creatine Kinase 


 


CK-MB (Mass) 


 


Troponin I 


 


C-Reactive Protein 


 


Total Protein 


 


Albumin 


 


Globulin 


 


Albumin/Globulin Ratio 


 


Free T4 


 


Free T3 pg/mL 


 


TSH 3rd Generation 


 


Prolactin 














Assessment & Plan


(1) Rapidly progressive weakness


Assessment and Plan: 


The stiffness, pain and weakness that is more prominent distally than proximally

, in the setting of prior ovarian cancer, is concerning for an auto-immune 

condition affecting muscles and nerves.  The differential includes polymyositis

, stiff person syndrome, auto-immune myopathies, etc.  Will order ANAs, 

antibodies, ESR, CRP and auto-immune panel.  Will rule out possible spinal cord 

involvement since she does have hyper-reflexia (concern for myelopathy).  Will 

start her on solumedrol 1000 mg daily for 3 days to see if there is any 

improvement.





Thank you for this consultation.  


Status: Acute

## 2018-09-21 NOTE — CP.PCM.PN
Subjective





- Date & Time of Evaluation


Date of Evaluation: 09/21/18


Time of Evaluation: 15:45





- Subjective


Subjective: 





Patient complaining of numbness of both hands, low back pain and weakness of 

both legs. Being evaluated by Neurology. PT and occupational therapy ordered. 

Lumbar spine XRay is normal. Telemetry: RSR.





Objective





- Vital Signs/Intake and Output


Vital Signs (last 24 hours): 


 











Temp Pulse Resp BP Pulse Ox


 


 98.3 F   79   20   112/77   100 


 


 09/21/18 16:00  09/21/18 16:00  09/21/18 16:00  09/21/18 16:00  09/21/18 16:00











- Medications


Medications: 


 Current Medications





Enoxaparin Sodium (Lovenox)  40 mg [place] DAILY [facility]


   Last Admin: 09/21/18 10:37 Dose:  40 mg


Gabapentin (Neurontin)  300 mg PO BID [facility]


   Last Admin: 09/21/18 19:48 Dose:  300 mg


Insulin Aspart (Novolog)  [facility]


   PRN Reason: Protocol


   Last Admin: 09/21/18 19:48 Dose:  Not Given


Losartan Potassium (Cozaar)  50 mg PO DAILY [facility]


   Last Admin: 09/21/18 10:37 Dose:  50 mg


Metoprolol Tartrate (Lopressor)  100 mg PO DAILY [facility]


   Last Admin: 09/21/18 10:37 Dose:  100 mg


Naproxen (Anaprox Ds)  550 mg PO BID [facility]


   Last Admin: 09/21/18 19:50 Dose:  550 mg


Rosuvastatin Calcium (Crestor)  20 mg PO HS [facility]


   Last Admin: 09/20/18 22:49 Dose:  20 mg


Sitagliptin Phosphate (Januvia)  100 mg PO DAILY [facility]


   Last Admin: 09/21/18 10:37 Dose:  100 mg











- Labs


Labs: 


 





 09/21/18 07:01 





 09/21/18 07:01 





 











PT  11.4 SECONDS (9.7-12.2)   09/20/18  03:58    


 


INR  1.0   09/20/18  03:58    


 


APTT  33 SECONDS (21-34)   09/20/18  03:58    














- Constitutional


Appears: Well, No Acute Distress





- Head Exam


Head Exam: NORMAL INSPECTION





- Eye Exam


Eye Exam: Normal appearance





- ENT Exam


ENT Exam: Normal Exam





- Neck Exam


Neck Exam: Normal Inspection





- Respiratory Exam


Respiratory Exam: Clear to Ausculation Bilateral


Additional comments: 





Tender left costo-chondral joint.





- Cardiovascular Exam


Cardiovascular Exam: REGULAR RHYTHM





- GI/Abdominal Exam


GI & Abdominal Exam: Soft, Normal Bowel Sounds





- Rectal Exam


Rectal Exam: Deferred





- Extremities Exam


Extremities Exam: Normal Inspection





- Back Exam


Back Exam: tenderness


Additional comments: 





Mild tenderness of the left low back.





- Neurological Exam


Neurological Exam: Alert, Awake, Oriented x3


Additional comments: 





Mild weakness of both legs.





- Psychiatric Exam


Psychiatric exam: Anxious





Assessment and Plan


(1) Chest pain


Assessment & Plan: 


Probably due to costochondritis. TNI's x 2 are normal.


Status: Resolved   





(2) Numbness and tingling in both hands


Assessment & Plan: 


And weakness of both legs. R/o cervical radiculopathy, r/o polyneuropathy. 

HgBA1C: 11. Patient is claustrophobic and request to be sedated totally if an 

MRI is be done. Will try Ativan 2mg IV before MRI.


Status: Acute   





(3) Low back pain


Assessment & Plan: 


Secondary to contusion following a fall. Try Naproxen to control the pain.


Status: Acute

## 2018-09-21 NOTE — RAD
Date of service: 



09/21/2018



PROCEDURE:  Radiographs of the Lumbar Spine.



HISTORY:

s/p fall. Low back pain



COMPARISON:

No prior.



FINDINGS:



BONES:

There is normal alignment of the lumbar vertebral bodies.  There is 

normal lumbar lordosis.  There is diffuse bone demineralization.  

There is no acute fracture, spondylolysis or spondylolisthesis.



DISC SPACES:

The disc heights are maintained.



OTHER FINDINGS:

None.



IMPRESSION:

No acute fracture, spondylolysis or spondylolisthesis.

## 2018-09-22 LAB
ALBUMIN SERPL-MCNC: 4.2 G/DL (ref 3.5–5)
ALBUMIN/GLOB SERPL: 1.2 {RATIO} (ref 1–2.1)
ALT SERPL-CCNC: 39 U/L (ref 9–52)
AST SERPL-CCNC: 37 U/L (ref 14–36)
BASOPHILS # BLD AUTO: 0 K/UL (ref 0–0.2)
BASOPHILS NFR BLD: 0.6 % (ref 0–2)
BUN SERPL-MCNC: 19 MG/DL (ref 7–17)
CALCIUM SERPL-MCNC: 10 MG/DL (ref 8.6–10.4)
EOSINOPHIL # BLD AUTO: 0.1 K/UL (ref 0–0.7)
EOSINOPHIL NFR BLD: 2.3 % (ref 0–4)
ERYTHROCYTE [DISTWIDTH] IN BLOOD BY AUTOMATED COUNT: 13.2 % (ref 11.5–14.5)
GFR NON-AFRICAN AMERICAN: > 60
HGB BLD-MCNC: 14 G/DL (ref 11–16)
LYMPHOCYTES # BLD AUTO: 2.9 K/UL (ref 1–4.3)
LYMPHOCYTES NFR BLD AUTO: 45.8 % (ref 20–40)
MCH RBC QN AUTO: 28.3 PG (ref 27–31)
MCHC RBC AUTO-ENTMCNC: 34.6 G/DL (ref 33–37)
MCV RBC AUTO: 81.7 FL (ref 81–99)
MONOCYTES # BLD: 0.6 K/UL (ref 0–0.8)
MONOCYTES NFR BLD: 9.9 % (ref 0–10)
NEUTROPHILS # BLD: 2.6 K/UL (ref 1.8–7)
NEUTROPHILS NFR BLD AUTO: 41.4 % (ref 50–75)
NRBC BLD AUTO-RTO: 0.1 % (ref 0–2)
PLATELET # BLD: 244 K/UL (ref 130–400)
PMV BLD AUTO: 9.6 FL (ref 7.2–11.7)
RBC # BLD AUTO: 4.96 MIL/UL (ref 3.8–5.2)
WBC # BLD AUTO: 6.3 K/UL (ref 4.8–10.8)

## 2018-09-22 RX ADMIN — METHYLPREDNISOLONE SODIUM SUCCINATE SCH MLS/HR: 1 INJECTION, POWDER, FOR SOLUTION INTRAMUSCULAR; INTRAVENOUS at 11:00

## 2018-09-22 RX ADMIN — INSULIN ASPART SCH: 100 INJECTION, SOLUTION INTRAVENOUS; SUBCUTANEOUS at 08:06

## 2018-09-22 RX ADMIN — INSULIN ASPART SCH: 100 INJECTION, SOLUTION INTRAVENOUS; SUBCUTANEOUS at 11:30

## 2018-09-22 RX ADMIN — NAPROXEN SODIUM SCH MG: 550 TABLET ORAL at 09:54

## 2018-09-22 RX ADMIN — NAPROXEN SODIUM SCH MG: 550 TABLET ORAL at 17:42

## 2018-09-22 RX ADMIN — ENOXAPARIN SODIUM SCH MG: 40 INJECTION SUBCUTANEOUS at 09:54

## 2018-09-22 RX ADMIN — INSULIN ASPART SCH U: 100 INJECTION, SOLUTION INTRAVENOUS; SUBCUTANEOUS at 21:11

## 2018-09-22 RX ADMIN — INSULIN ASPART SCH: 100 INJECTION, SOLUTION INTRAVENOUS; SUBCUTANEOUS at 21:02

## 2018-09-23 RX ADMIN — ENOXAPARIN SODIUM SCH MG: 40 INJECTION SUBCUTANEOUS at 09:18

## 2018-09-23 RX ADMIN — GLIPIZIDE SCH MG: 5 TABLET, EXTENDED RELEASE ORAL at 14:04

## 2018-09-23 RX ADMIN — INSULIN ASPART SCH U: 100 INJECTION, SOLUTION INTRAVENOUS; SUBCUTANEOUS at 17:38

## 2018-09-23 RX ADMIN — INSULIN ASPART SCH U: 100 INJECTION, SOLUTION INTRAVENOUS; SUBCUTANEOUS at 07:37

## 2018-09-23 RX ADMIN — NAPROXEN SODIUM SCH MG: 550 TABLET ORAL at 17:38

## 2018-09-23 RX ADMIN — NAPROXEN SODIUM SCH MG: 550 TABLET ORAL at 09:18

## 2018-09-23 RX ADMIN — INSULIN ASPART SCH U: 100 INJECTION, SOLUTION INTRAVENOUS; SUBCUTANEOUS at 12:13

## 2018-09-23 RX ADMIN — INSULIN ASPART SCH U: 100 INJECTION, SOLUTION INTRAVENOUS; SUBCUTANEOUS at 21:27

## 2018-09-23 RX ADMIN — METHYLPREDNISOLONE SODIUM SUCCINATE SCH MLS/HR: 1 INJECTION, POWDER, FOR SOLUTION INTRAMUSCULAR; INTRAVENOUS at 09:23

## 2018-09-23 NOTE — CP.PCM.PN
Subjective





- Date & Time of Evaluation


Date of Evaluation: 09/23/18


Time of Evaluation: 13:44





- Subjective


Subjective: 





Patient still complaining of weakness of both legs and numbness of both hands. 

Blood glucose elevated secondary to Solumedrol IV. Will try to do MRI of the 

neck and low back in AM with sedation with Ativan IV.





Objective





- Vital Signs/Intake and Output


Vital Signs (last 24 hours): 


 











Temp Pulse Resp BP Pulse Ox


 


 97.6 F   100 H  20   145/88   100 


 


 09/23/18 07:00  09/23/18 07:00  09/23/18 07:00  09/23/18 07:00  09/23/18 07:00








Intake and Output: 


 











 09/23/18 09/23/18





 06:59 18:59


 


Intake Total 600 


 


Balance 600 














- Medications


Medications: 


 Current Medications





Enoxaparin Sodium (Lovenox)  40 mg SC DAILY [facility]


   Last Admin: 09/23/18 09:18 Dose:  40 mg


Gabapentin (Neurontin)  300 mg PO BID [facility]


   Last Admin: 09/23/18 09:18 Dose:  300 mg


Glipizide (Glucotrol Xl)  5 mg PO DAILY [facility]


Methylprednisolone 1 gm/ (Sodium Chloride)  100 mls @ 200 mls/hr IV DAILY [facility]


   Stop: 09/25/18 10:01


   Last Admin: 09/23/18 09:23 Dose:  200 mls/hr


Insulin Aspart (Novolog)  0 unit SC [facility]S [facility]


   PRN Reason: Protocol


   Last Admin: 09/23/18 12:13 Dose:  6 u


Lorazepam (Ativan)  1 mg IVP ONCE PRN


   PRN Reason: Anxiety


Losartan Potassium (Cozaar)  50 mg PO DAILY [facility]


   Last Admin: 09/23/18 09:18 Dose:  50 mg


Metoprolol Tartrate (Lopressor)  100 mg PO DAILY [facility]


   Last Admin: 09/23/18 09:18 Dose:  100 mg


Naproxen (Anaprox Ds)  550 mg PO BID [facility]


   Last Admin: 09/23/18 09:18 Dose:  550 mg


Rosuvastatin Calcium (Crestor)  20 mg PO HS [facility]


   Last Admin: 09/22/18 21:11 Dose:  20 mg


Sitagliptin Phosphate (Januvia)  100 mg PO DAILY [facility]


   Last Admin: 09/23/18 09:18 Dose:  100 mg











- Labs


Labs: 


 





 09/22/18 07:40 





 09/22/18 07:40 





 











PT  11.4 SECONDS (9.7-12.2)   09/20/18  03:58    


 


INR  1.0   09/20/18  03:58    


 


APTT  33 SECONDS (21-34)   09/20/18  03:58    














- Constitutional


Appears: Well, No Acute Distress





- Head Exam


Head Exam: NORMAL INSPECTION





- Eye Exam


Eye Exam: Normal appearance


Pupil Exam: NORMAL ACCOMODATION





- ENT Exam


ENT Exam: Normal Exam





- Neck Exam


Neck Exam: Normal Inspection





- Respiratory Exam


Respiratory Exam: Clear to Ausculation Bilateral, NORMAL BREATHING PATTERN





- Cardiovascular Exam


Cardiovascular Exam: REGULAR RHYTHM





- GI/Abdominal Exam


GI & Abdominal Exam: Soft, Normal Bowel Sounds





- Rectal Exam


Rectal Exam: Deferred





-  Exam


 Exam: NORMAL INSPECTION





- Extremities Exam


Additional comments: 





MIld weakness of both legs.





- Back Exam


Back Exam: NORMAL INSPECTION





- Neurological Exam


Neurological Exam: Alert, Oriented x3


Additional comments: 





Unsteady gait.





- Psychiatric Exam


Psychiatric exam: Anxious





- Skin


Skin Exam: Dry





Assessment and Plan


(1) Chest pain


Status: Resolved   





(2) Numbness and tingling in both hands


Status: Acute   





(3) Low back pain


Status: Acute   





(4) Weakness of both legs


Assessment & Plan: 


For MRI of the cervical and lumbar spines in AM.


Status: Acute

## 2018-09-24 RX ADMIN — ENOXAPARIN SODIUM SCH MG: 40 INJECTION SUBCUTANEOUS at 10:33

## 2018-09-24 RX ADMIN — INSULIN ASPART SCH U: 100 INJECTION, SOLUTION INTRAVENOUS; SUBCUTANEOUS at 18:23

## 2018-09-24 RX ADMIN — METHYLPREDNISOLONE SODIUM SUCCINATE SCH MLS/HR: 1 INJECTION, POWDER, FOR SOLUTION INTRAMUSCULAR; INTRAVENOUS at 10:33

## 2018-09-24 RX ADMIN — INSULIN ASPART SCH U: 100 INJECTION, SOLUTION INTRAVENOUS; SUBCUTANEOUS at 12:40

## 2018-09-24 RX ADMIN — NAPROXEN SODIUM SCH MG: 550 TABLET ORAL at 10:33

## 2018-09-24 RX ADMIN — GLIPIZIDE SCH MG: 5 TABLET, EXTENDED RELEASE ORAL at 10:33

## 2018-09-24 RX ADMIN — NAPROXEN SODIUM SCH MG: 550 TABLET ORAL at 18:23

## 2018-09-24 RX ADMIN — INSULIN ASPART SCH: 100 INJECTION, SOLUTION INTRAVENOUS; SUBCUTANEOUS at 21:40

## 2018-09-24 RX ADMIN — INSULIN ASPART SCH U: 100 INJECTION, SOLUTION INTRAVENOUS; SUBCUTANEOUS at 08:37

## 2018-09-24 NOTE — MRI
Date of service: 



09/24/2018



PROCEDURE:  MR THORACIC SPINE WITH AND WITHOUT CONTRAST



HISTORY:

Lower extremity weakness



COMPARISON:

None available.



TECHNIQUE:

Multiecho multiplanar sequences were performed through the thoracic 

spine with and without the use of intravenous contrast.  16 cc 

Omniscan was injected intravenously 



FINDINGS:



ALIGNMENT:

There is normal alignment of the thoracic vertebral bodies.  There is 

normal thoracic kyphosis. 



VERTEBRA:

Vertebral body height are preserved.  No acute fracture or bone 

destruction. 



MARROW:

Marrow signal is within normal limits.



PARASPINAL SOFT TISSUES:

The paraspinous soft tissues are normal.



CORD:

The thoracic cord is normal in contour, caliber and has normal 

intrinsic signal.  No volume loss, signal abnormality or syrinx.



DISCS:

No disc herniation, spinal canal stenosis, or neuroforaminal 

narrowing.



ENHANCEMENT:

No abnormal intramedullary or leptomeningeal enhancement.



OTHER FINDINGS:

None.



IMPRESSION:

Normal pre and post contrast enhanced MRI of the thoracic spine

## 2018-09-24 NOTE — CP.PCM.PN
Subjective





- Date & Time of Evaluation


Date of Evaluation: 09/24/18


Time of Evaluation: 19:40





- Subjective


Subjective: 





Patient still with leg weakness. On Solumedrol resulting in elevated blood 

glucose. MRI of the thoracic spines: WNL.





Objective





- Vital Signs/Intake and Output


Vital Signs (last 24 hours): 


 











Temp Pulse Resp BP Pulse Ox


 


 97.4 F L  92 H  20   137/80   97 


 


 09/24/18 16:05  09/24/18 16:05  09/24/18 16:05  09/24/18 16:05  09/24/18 16:05








Intake and Output: 


 











 09/24/18 09/25/18





 18:59 06:59


 


Intake Total  500


 


Balance  500














- Medications


Medications: 


 Current Medications





Bisacodyl (Dulcolax)  10 mg [place] ONCE PRN


   PRN Reason: Constipation


Enoxaparin Sodium (Lovenox)  40 mg SC DAILY [facility]


   Last Admin: 09/24/18 10:33 Dose:  40 mg


Gabapentin (Neurontin)  300 mg PO BID [facility]


   Last Admin: 09/24/18 18:23 Dose:  300 mg


Glipizide (Glucotrol Xl)  5 mg PO DAILY [facility]


   Last Admin: 09/24/18 10:33 Dose:  5 mg


Methylprednisolone 1 gm/ (Sodium Chloride)  100 mls @ 200 mls/hr IV DAILY [facility]


   Stop: 09/25/18 10:01


   Last Admin: 09/24/18 10:33 Dose:  200 mls/hr


Insulin Aspart (Novolog)  0 unit SC [facility]S [facility]


   PRN Reason: Protocol


   Last Admin: 09/24/18 21:40 Dose:  Not Given


Losartan Potassium (Cozaar)  50 mg PO DAILY [facility]


   Last Admin: 09/24/18 10:33 Dose:  50 mg


Metoprolol Tartrate (Lopressor)  100 mg PO DAILY [facility]


   Last Admin: 09/24/18 10:31 Dose:  100 mg


Naproxen (Anaprox Ds)  550 mg PO BID [facility]


   Last Admin: 09/24/18 18:23 Dose:  550 mg


Rosuvastatin Calcium (Crestor)  20 mg PO HS [facility]


   Last Admin: 09/24/18 21:40 Dose:  20 mg


Sitagliptin Phosphate (Januvia)  100 mg PO DAILY [facility]


   Last Admin: 09/24/18 10:31 Dose:  100 mg











- Labs


Labs: 


 





 09/22/18 07:40 





 09/22/18 07:40 





 











PT  11.4 SECONDS (9.7-12.2)   09/20/18  03:58    


 


INR  1.0   09/20/18  03:58    


 


APTT  33 SECONDS (21-34)   09/20/18  03:58    














- Constitutional


Appears: Well, No Acute Distress





- Head Exam


Head Exam: NORMAL INSPECTION





- Eye Exam


Eye Exam: Normal appearance





- ENT Exam


ENT Exam: Normal Exam





- Neck Exam


Neck Exam: Normal Inspection





- Respiratory Exam


Respiratory Exam: Clear to Ausculation Bilateral, NORMAL BREATHING PATTERN





- Cardiovascular Exam


Cardiovascular Exam: REGULAR RHYTHM





- GI/Abdominal Exam


GI & Abdominal Exam: Soft, Normal Bowel Sounds





- Rectal Exam


Rectal Exam: Deferred





- Extremities Exam


Extremities Exam: Normal Inspection





- Back Exam


Back Exam: NORMAL INSPECTION





- Neurological Exam


Neurological Exam: Alert, Awake, Oriented x3


Additional comments: 





Mild weakness of both legs.





- Psychiatric Exam


Psychiatric exam: Anxious





- Skin


Skin Exam: Dry, Intact





Assessment and Plan


(1) Chest pain


Status: Resolved   





(2) Numbness and tingling in both hands


Assessment & Plan: 


Awaiting MRI of the cervical spines.


Status: Chronic   





(3) Low back pain


Status: Acute   





(4) Weakness of both legs


Assessment & Plan: 


Awaiting MRI of the lumbar spines.


Status: Acute

## 2018-09-24 NOTE — CARD
--------------- APPROVED REPORT --------------





Date of service: 09/20/2018



EKG Measurement

Heart [id number]

ND [id number]

[id number]

[id number]

[id number]



<Conclusion>

Sinus tachycardia

Cannot rule out Anterior infarct, age undetermined

Abnormal ECG

## 2018-09-24 NOTE — CARD
--------------- APPROVED REPORT --------------





Date of service: 09/20/2018



EKG Measurement

Heart [id number]

KS [id number]

[id number]

[id number]

[id number]



<Conclusion>

Normal sinus rhythm

Normal ECG

## 2018-09-25 RX ADMIN — NAPROXEN SODIUM SCH MG: 550 TABLET ORAL at 13:09

## 2018-09-25 RX ADMIN — INSULIN ASPART SCH U: 100 INJECTION, SOLUTION INTRAVENOUS; SUBCUTANEOUS at 17:59

## 2018-09-25 RX ADMIN — NAPROXEN SODIUM SCH MG: 550 TABLET ORAL at 17:59

## 2018-09-25 RX ADMIN — INSULIN ASPART SCH U: 100 INJECTION, SOLUTION INTRAVENOUS; SUBCUTANEOUS at 13:10

## 2018-09-25 RX ADMIN — GLIPIZIDE SCH MG: 5 TABLET, EXTENDED RELEASE ORAL at 13:09

## 2018-09-25 RX ADMIN — METHYLPREDNISOLONE SODIUM SUCCINATE SCH MLS/HR: 1 INJECTION, POWDER, FOR SOLUTION INTRAMUSCULAR; INTRAVENOUS at 13:10

## 2018-09-25 RX ADMIN — INSULIN ASPART SCH U: 100 INJECTION, SOLUTION INTRAVENOUS; SUBCUTANEOUS at 21:37

## 2018-09-25 RX ADMIN — ENOXAPARIN SODIUM SCH MG: 40 INJECTION SUBCUTANEOUS at 13:09

## 2018-09-25 RX ADMIN — INSULIN ASPART SCH U: 100 INJECTION, SOLUTION INTRAVENOUS; SUBCUTANEOUS at 08:56

## 2018-09-25 NOTE — CP.PCM.PN
Subjective





- Date & Time of Evaluation


Date of Evaluation: 09/25/18


Time of Evaluation: 14:47





- Subjective


Subjective: 





Patient just had MRI of the cervical and lumbar spines. Still slightly sedated, 

and constipated





Objective





- Vital Signs/Intake and Output


Vital Signs (last 24 hours): 


                                        











Temp Pulse Resp BP Pulse Ox


 


 97.5 F L  85   20   140/82   99 


 


 09/25/18 00:00  09/25/18 00:00  09/25/18 00:00  09/25/18 00:00  09/25/18 00:00








Intake and Output: 


                                        











 09/25/18 09/25/18





 06:59 18:59


 


Intake Total 500 


 


Balance 500 














- Medications


Medications: 


                               Current Medications





Bisacodyl (Dulcolax)  10 mg [place] ONCE PRN


   PRN Reason: Constipation


Enoxaparin Sodium (Lovenox)  40 mg SC DAILY [facility]


   Last Admin: 09/25/18 13:09 Dose:  40 mg


Gabapentin (Neurontin)  300 mg PO BID [facility]


   Last Admin: 09/25/18 13:10 Dose:  300 mg


Glipizide (Glucotrol Xl)  5 mg PO DAILY [facility]


   Last Admin: 09/25/18 13:09 Dose:  5 mg


Insulin Aspart (Novolog)  0 unit SC [facility]; Protocol


   Last Admin: 09/25/18 13:10 Dose:  4 u


Losartan Potassium (Cozaar)  50 mg PO DAILY [facility]


   Last Admin: 09/25/18 13:09 Dose:  50 mg


Metoprolol Tartrate (Lopressor)  100 mg PO DAILY [facility]


   Last Admin: 09/25/18 13:09 Dose:  100 mg


Naproxen (Anaprox Ds)  550 mg PO BID [facility]


   Last Admin: 09/25/18 13:09 Dose:  550 mg


Rosuvastatin Calcium (Crestor)  20 mg PO [facility]


   Last Admin: 09/24/18 21:40 Dose:  20 mg


Sitagliptin Phosphate (Januvia)  100 mg PO DAILY [facility]


   Last Admin: 09/25/18 13:09 Dose:  100 mg











- Labs


Labs: 


                                        





                                 09/22/18 07:40 





                                 09/22/18 07:40 





                                        











PT  11.4 SECONDS (9.7-12.2)   09/20/18  03:58    


 


INR  1.0   09/20/18  03:58    


 


APTT  33 SECONDS (21-34)   09/20/18  03:58    














- Constitutional


Appears: Well, No Acute Distress





- Head Exam


Head Exam: NORMAL INSPECTION





- Eye Exam


Eye Exam: Normal appearance


Pupil Exam: NORMAL ACCOMODATION





- ENT Exam


ENT Exam: Normal Exam





- Neck Exam


Neck Exam: Normal Inspection





- Respiratory Exam


Respiratory Exam: Clear to Ausculation Bilateral, NORMAL BREATHING PATTERN





- Cardiovascular Exam


Cardiovascular Exam: REGULAR RHYTHM





- GI/Abdominal Exam


GI & Abdominal Exam: Soft, Normal Bowel Sounds





- Rectal Exam


Rectal Exam: Deferred





- Extremities Exam


Extremities Exam: Normal Inspection


Additional comments: 





Mild weakness of the lower extremities.





- Back Exam


Back Exam: NORMAL INSPECTION





- Neurological Exam


Neurological Exam: Alert, Oriented x3





- Psychiatric Exam


Psychiatric exam: Anxious





- Skin


Skin Exam: Dry, Normal Color





Assessment and Plan


(1) Numbness and tingling in both hands


Assessment & Plan: 


Awaiting MRI result of the cervical spines.


Status: Chronic   





(2) Low back pain


Status: Acute   





(3) Weakness of both legs


Assessment & Plan: 


Awaiting MRI result of the lumbar spines. To continue PT, and OT.


Status: Acute

## 2018-09-25 NOTE — MRI
Date of service: 



09/25/2018



PROCEDURE:  MR CERVICAL SPINE WITH AND WITHOUT CONTRAST



HISTORY:

bilateral arm weakness



COMPARISON:

None available. 



TECHNIQUE:

Multiecho multiplanar sequences were performed through the cervical 

spine with and without the use of intravenous contrast.



FINDINGS:

There is mild straightening of the cervical curvature without 

fracture or spondylolisthesis identified.  No suspicious matter 

signal changes are seen.  Diffuse disc desiccation is identified.



The cervical cord appears mild to mildly impinged at C4-5 with 

limited cord reaction but no enhancement.  Cervical cord above this 

C4 and below the C5 level is normal in course caliber contour and 

intrinsic signal.  Upper thoracic cord segment down to the 

approximate T4 level appears unremarkable. 



Craniocervical junction unremarkable.  Prevertebral paraspinal soft 

tissues appear diffusely unremarkable. 



No suspicious epidural or intrathecal enhancement identified. 



C2-3:

No disc herniation, spinal canal stenosis or neural foraminal 

narrowing. 



C3-4:

Limited disc bulging is appreciated encroaching ventral nerve roots 

without causing significant central canal stenosis overall.  No 

neural foraminal stenosis bilaterally.  No disc herniation. 



C4-5:

There is a large generalized disc bulge with left paracentral disc 

herniation causing mild-to-moderate central canal stenosis and 

indenting the ventral cord and flattening it somewhat.  

Mild-to-moderate local reactive cord signal changes are identified 

here. 



C5-C6:

Limited disc bulging is seen without herniation encroaching ventral 

nerve roots but without causing generalized central canal stenosis.  

No neural foraminal stenosis. 



C6-C7:

No disc herniation, spinal canal stenosis or neuroforaminal 

narrowing. 



C7-T1:

No  disc herniation, spinal canal stenosis or neural foraminal 

narrowing. 



OTHER FINDINGS:

None. 



IMPRESSION:

1. A generalized C4-5 disc bulge is associated with a small left 

paracentral disc herniation impinging the cervical cord with 

mild-to-moderate cord reaction present here.  Mild-to-moderate 

central canal stenosis results.  No significant neural foraminal 

stenosis bilaterally. 



2. C3-4 disc bulge encroaches ventral nerve roots without causing 

generalized central canal stenosis. 



3. Straightened cervical curvature. 



Findings discussed with Dr. Meyers with written down and read back 

verification 09/25/2018 4:10 p.m..

## 2018-09-25 NOTE — MRI
Date of service: 



09/25/2018



PROCEDURE:  MR LUMBAR SPINE WITH AND WITHOUT CONTRAST



HISTORY:

lower extremity weakness



COMPARISON:

None available. 



TECHNIQUE:

Multiecho multiplanar sequences were performed through the lumbar 

spine with and without the use of intravenous contrast.  18 cc of 

Omniscan was utilized for intravenous contrast (single injection was 

given for both the cervical and lumbar spine MRI examinations 

performed today 09/25/2018). 



FINDINGS:

Normal lumbar lordosis.



Vertebral body heights are preserved.



Marrow signal unremarkable.  Adequate hydration is seen throughout 

the lumbar intervertebral disc spaces with exception of L5-S1 which 

is markedly desiccated.



Conus medullaris unremarkable at the level of L3 superior endplate.



Prevertebral and paraspinal soft tissues are unremarkable.



No abnormal epidural or intrathecal enhancement. 



T12-L1:

No disc herniation, spinal canal stenosis or neural foraminal 

narrowing. 



L1-2:

No disc herniation, spinal canal stenosis or neural foraminal 

narrowing. 



L2-3:

No disc herniation, spinal canal stenosis or neural foraminal 

narrowing. 



L3-4:

No disc herniation, spinal canal stenosis or neural foraminal 

narrowing. 



L4-5:

No disc herniation or neural foraminal stenosis bilaterally.  

Moderate facet joint degenerative changes narrow the bilateral 

lateral recesses mildly. 



L5-S1:

No disc herniation, spinal canal stenosis or neural foraminal 

narrowing. 



OTHER FINDINGS:

None. 



IMPRESSION:

Unremarkable pre and post contrast enhanced MRI of the lumbar spine.



Mild narrowing of the bilateral lateral recesses at L4-5 due to facet 

joint degenerative changes.  No disc herniation or generalized 

central canal stenosis throughout the examination.

## 2018-09-26 RX ADMIN — INSULIN ASPART SCH UNITS: 100 INJECTION, SOLUTION INTRAVENOUS; SUBCUTANEOUS at 17:15

## 2018-09-26 RX ADMIN — INSULIN ASPART SCH U: 100 INJECTION, SOLUTION INTRAVENOUS; SUBCUTANEOUS at 13:53

## 2018-09-26 RX ADMIN — INSULIN ASPART SCH: 100 INJECTION, SOLUTION INTRAVENOUS; SUBCUTANEOUS at 21:26

## 2018-09-26 RX ADMIN — NAPROXEN SODIUM SCH MG: 550 TABLET ORAL at 12:12

## 2018-09-26 RX ADMIN — INSULIN ASPART SCH U: 100 INJECTION, SOLUTION INTRAVENOUS; SUBCUTANEOUS at 06:43

## 2018-09-26 RX ADMIN — NAPROXEN SODIUM SCH MG: 550 TABLET ORAL at 17:15

## 2018-09-26 RX ADMIN — GLIPIZIDE SCH MG: 5 TABLET, EXTENDED RELEASE ORAL at 10:44

## 2018-09-26 NOTE — CP.PCM.PN
Subjective





- Date & Time of Evaluation


Date of Evaluation: 09/26/18


Time of Evaluation: 20:00





- Subjective


Subjective: 





Patient still with some numbness of the left hand and weakness of the legs. MRI 

of the cervical spine reveals herniated cervical disc with moderate stenosis at 

the level of C4-C5, and mild to moderate impingement of the spinal cord at that 

level. Patient is scheduled for a cervical spine surgery in AM, but, she 

expresses reluctance for it.





Objective





- Vital Signs/Intake and Output


Vital Signs (last 24 hours): 


                                        











Temp Pulse Resp BP Pulse Ox


 


 98 F   80   20   123/78   97 


 


 09/26/18 15:57  09/26/18 15:57  09/26/18 15:57  09/26/18 15:57  09/26/18 15:57








Intake and Output: 


                                        











 09/26/18 09/27/18





 18:59 06:59


 


Intake Total 500 500


 


Balance 500 500














- Medications


Medications: 


                               Current Medications





Bisacodyl (Dulcolax)  10 mg [place] ONCE PRN


   PRN Reason: Constipation


Gabapentin (Neurontin)  300 mg PO BID [facility]


   Last Admin: 09/26/18 17:15 Dose:  300 mg


Glipizide (Glucotrol Xl)  5 mg PO DAILY [facility]


   Last Admin: 09/26/18 10:44 Dose:  5 mg


Insulin Aspart (Novolog)  0 unit SC [facility]; Protocol


   Last Admin: 09/26/18 21:26 Dose:  Not Given


Losartan Potassium (Cozaar)  50 mg PO DAILY [facility]


   Last Admin: 09/26/18 10:43 Dose:  50 mg


Metoprolol Tartrate (Lopressor)  100 mg PO DAILY [facility]


   Last Admin: 09/26/18 10:43 Dose:  100 mg


Naproxen (Anaprox Ds)  550 mg PO BID [facility]


   Last Admin: 09/26/18 17:15 Dose:  550 mg


Rosuvastatin Calcium (Crestor)  20 mg PO HS [facility]


   Last Admin: 09/26/18 21:30 Dose:  20 mg


Sitagliptin Phosphate (Januvia)  100 mg PO DAILY [facility]


   Last Admin: 09/26/18 10:43 Dose:  100 mg











- Labs


Labs: 


                                        





                                 09/22/18 07:40 





                                 09/22/18 07:40 





                                        











PT  11.4 SECONDS (9.7-12.2)   09/20/18  03:58    


 


INR  1.0   09/20/18  03:58    


 


APTT  33 SECONDS (21-34)   09/20/18  03:58    














- Constitutional


Appears: No Acute Distress





- Head Exam


Head Exam: NORMAL INSPECTION





- Eye Exam


Eye Exam: Normal appearance


Pupil Exam: NORMAL ACCOMODATION





- ENT Exam


ENT Exam: Normal Exam





- Neck Exam


Neck Exam: Normal Inspection





- Respiratory Exam


Respiratory Exam: Clear to Ausculation Bilateral, NORMAL BREATHING PATTERN





- Cardiovascular Exam


Cardiovascular Exam: REGULAR RHYTHM





- GI/Abdominal Exam


GI & Abdominal Exam: Soft, Normal Bowel Sounds





- Rectal Exam


Rectal Exam: Deferred





- Extremities Exam


Extremities Exam: Normal Inspection





- Back Exam


Back Exam: NORMAL INSPECTION





- Neurological Exam


Neurological Exam: Alert, Awake, Oriented x3





- Psychiatric Exam


Psychiatric exam: Anxious





- Skin


Skin Exam: Dry, Intact





Assessment and Plan


(1) Numbness and tingling in both hands


Assessment & Plan: 


MRI of the cervical spines reveals disc herniation with mild to moderate 

impingement of the spinal cord at the level of C4-C5. Scheduled for cervical 

spine surgery in AM, but the patient is still hesitant about it.


Status: Chronic   





(2) Low back pain


Status: Acute   





(3) Weakness of both legs


Status: Acute

## 2018-09-27 RX ADMIN — ENOXAPARIN SODIUM SCH MG: 40 INJECTION SUBCUTANEOUS at 17:08

## 2018-09-27 RX ADMIN — GLIPIZIDE SCH MG: 5 TABLET, EXTENDED RELEASE ORAL at 10:05

## 2018-09-27 RX ADMIN — INSULIN ASPART SCH UNITS: 100 INJECTION, SOLUTION INTRAVENOUS; SUBCUTANEOUS at 17:07

## 2018-09-27 RX ADMIN — INSULIN ASPART SCH: 100 INJECTION, SOLUTION INTRAVENOUS; SUBCUTANEOUS at 21:17

## 2018-09-27 RX ADMIN — INSULIN ASPART SCH: 100 INJECTION, SOLUTION INTRAVENOUS; SUBCUTANEOUS at 08:24

## 2018-09-27 RX ADMIN — INSULIN ASPART SCH UNITS: 100 INJECTION, SOLUTION INTRAVENOUS; SUBCUTANEOUS at 12:48

## 2018-09-27 RX ADMIN — NAPROXEN SODIUM SCH MG: 550 TABLET ORAL at 10:05

## 2018-09-27 RX ADMIN — NAPROXEN SODIUM SCH MG: 550 TABLET ORAL at 17:08

## 2018-09-27 NOTE — CON
DATE:  09/26/2018



REASON FOR CONSULTATION:  Spinal cord compression in the neck.



HISTORY OF PRESENT ILLNESS:  The patient is a 48-year-old young lady who

states that for the past couple of years, she has had some funny feeling in

her legs.  She had a workup for arthritis which was negative.  She was

complaining of some issues with her arms, much more in the right than the

left, and she is right handed.  She had an EMG nerve conduction study done

which reportedly did not show any evidence of peripheral neuropathy as she

is a diabetic..  However, she states with no antecedent trauma two weeks

ago, things became markedly worse.  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  She states she could

not really sign her name anymore.  She could not do fine motor work such as

buttoning buttons etc. and was dropping things.  She does really complain

of pain in the legs per se, but just states that she cannot really walk

because her balance and gait completely off.  A week ago, she was up on a

small stool to put _____ closet and lost the balance and fell backward,

striking her lower back and her head.  No loss of consciousness.  She does

not state that anything got markedly worse in terms of symptoms that she

had prior to the fall compared to after the fall.  She now has pain in the

neck and the lower back which she did not have before but again presumably

this is musculoskeletal from the fall itself.  No loss of bowel or bladder

control.  She was admitted on 09/20/2018 with complaints of chest pain and

problems with her extremities.  An MRI of her cervical spine done yesterday

revealed cord compression, and a consult was called.



PAST MEDICAL HISTORY:  Significant for non-insulin-dependent diabetes as

mentioned.  She also has a history of hypertension, hyperlipidemia, ovarian

cancer, and thyroid nodules.  She has a history of paroxysmal

supraventricular tachycardia also.  She had an echocardiogram done at this

hospital in 06/2018 which she states she was told it was normal.



MEDICATIONS:  As listed on the chart.



ALLERGIES:  SHE HAS NO KNOWN ALLERGIES.



PAST SURGICAL HISTORY:  Significant for appendectomy.



SOCIAL HISTORY:  No history of smoking or alcohol.



PHYSICAL EXAMINATION:  On examination, she can flex her chin to about a

fingerbreadth upper sternal notch.  She has reasonable extension.  Lateral

rotation right to left side about 40 degrees at which time she complains of

right-sided discomfort and she _____ rotate by 30 degrees to the right. 

She is tender to palpation of the trapezial muscles on each side.  She

moves both upper extremities actively.  She has pretty good strength in her

shoulder abductors and even her biceps, but distally the wrist muscle

groups do not seem to be quite as strong.  She definitely has a weak 

strength especially on the right side which is her dominant side.  She has

a positive Ghosh's sign.  Good distal pulses.  Reflexes are intact. 

Reflexes are little brisker on the lower extremities.  No clonus is

present.  Babinski showed toes downgoing.  She states the sensation is

intact to light touch in both lower extremities.



MRIs were reviewed.  Thoracic and lumbar MRIs were done, read as, not

showing any significant abnormalities.  However, the MRI of her cervical

spine demonstrates a central right-sided herniated disk at C4-C5 that is

causing significant spinal cord compression with flattening.  There are

some reactive signal changes within the cord at that level.  The

radiologist read this as a left disk herniation with a generalized disk

bulge, but I think it is more worse on the right side which goes along with

her clinical presentation.  She has some mild changes at the other levels,

but again no obvious disk herniation at C3-C4 nor C4-C5 with some limited

bulging at the disk, but again no encroachment upon the cord at all.



ASSESSMENT AND PLAN:  Impression is that of a cervical myelopathy at C4-C5.

Certainly, she gets classic presentation in terms of her upper and lower

extremity complaints.  This has gotten markedly worse over the past two

weeks to the point that she is having significant difficulty just to

ambulating and is losing ability to function with her hands.  At this

point, I would strongly recommend one-level anterior cervical diskectomy

and fusion at C4-C5.  I explained that the biggest reason to do this is to

stop the progression of the symptoms and then hopefully, given her young

age, allow her to get recovery of function.  That is still more difficult

to protect in some with diabetes even if the EMG did not show any evidence

of a peripheral neuropathy in the past.  I reviewed the MRI with the

patient as well as her family members.  They are going to talk it over and

make a decision.  If she is medically cleared and they agree, then we will

do this tomorrow morning.  The risks and technique were explained to her

including problems with possible getting the bone to fuse and the most

common one being difficulty swallowing having a sore throat after the

surgery.  She did inquire about when she could go back to work and again I

explained _____ quickly she does or does not recover her functional

abilities in terms of being able to ambulate and use her hands.



Thank you for allowing me to participate in the care of your patient.





__________________________________________

Francis Larose MD





DD:  09/26/2018 13:50:10

DT:  09/26/2018 13:59:15

Job # [id number]

## 2018-09-28 RX ADMIN — INSULIN ASPART SCH: 100 INJECTION, SOLUTION INTRAVENOUS; SUBCUTANEOUS at 21:54

## 2018-09-28 RX ADMIN — ENOXAPARIN SODIUM SCH MG: 40 INJECTION SUBCUTANEOUS at 09:43

## 2018-09-28 RX ADMIN — GLIPIZIDE SCH MG: 5 TABLET, EXTENDED RELEASE ORAL at 09:43

## 2018-09-28 RX ADMIN — INSULIN ASPART SCH UNITS: 100 INJECTION, SOLUTION INTRAVENOUS; SUBCUTANEOUS at 17:06

## 2018-09-28 RX ADMIN — INSULIN ASPART SCH: 100 INJECTION, SOLUTION INTRAVENOUS; SUBCUTANEOUS at 07:13

## 2018-09-28 RX ADMIN — INSULIN ASPART SCH: 100 INJECTION, SOLUTION INTRAVENOUS; SUBCUTANEOUS at 11:31

## 2018-09-28 NOTE — CP.PCM.PN
Subjective





- Date & Time of Evaluation


Date of Evaluation: 09/28/18


Time of Evaluation: 20:00





- Subjective


Subjective: 





Patient has no additional complaint. Awaiting transfer to [facility] for 

cervical spine surgery.





Objective





- Vital Signs/Intake and Output


Vital Signs (last 24 hours): 


                                        











Temp Pulse Resp BP Pulse Ox


 


 98.9 F   78   20   109/72   100 


 


 09/28/18 16:00  09/28/18 16:00  09/28/18 16:00  09/28/18 16:00  09/28/18 16:00








Intake and Output: 


                                        











 09/28/18 09/29/18





 18:59 06:59


 


Intake Total  600


 


Balance  600














- Medications


Medications: 


                               Current Medications





Bisacodyl (Dulcolax)  10 mg VT ONCE PRN


   PRN Reason: Constipation


Enoxaparin Sodium (Lovenox)  40 mg SC DAILY [facility], [facility]


   Last Admin: 09/28/18 09:43 Dose:  40 mg


Gabapentin (Neurontin)  300 mg PO BID [facility]


   Last Admin: 09/28/18 17:05 Dose:  300 mg


Glipizide (Glucotrol Xl)  5 mg PO DAILY [facility]


   Last Admin: 09/28/18 09:43 Dose:  5 mg


Insulin Aspart (Novolog)  0 unit SC [facility]; Protocol


   Last Admin: 09/28/18 21:54 Dose:  Not Given


Losartan Potassium (Cozaar)  50 mg PO DAILY [facility]


   Last Admin: 09/28/18 09:43 Dose:  50 mg


Metoprolol Tartrate (Lopressor)  100 mg PO DAILY [facility]


   Last Admin: 09/28/18 09:43 Dose:  100 mg


Rosuvastatin Calcium (Crestor)  20 mg PO [facility]


   Last Admin: 09/28/18 21:05 Dose:  20 mg


Sitagliptin Phosphate (Januvia)  100 mg PO DAILY [facility]


   Last Admin: 09/28/18 09:43 Dose:  100 mg











- Labs


Labs: 


                                        





                                 09/22/18 07:40 





                                 09/22/18 07:40 





                                        











PT  11.4 SECONDS (9.7-12.2)   09/20/18  03:58    


 


INR  1.0   09/20/18  03:58    


 


APTT  33 SECONDS (21-34)   09/20/18  03:58    














- Constitutional


Appears: Well, Chronically Ill





- Head Exam


Head Exam: NORMAL INSPECTION





- Eye Exam


Eye Exam: Normal appearance


Pupil Exam: NORMAL ACCOMODATION





- ENT Exam


ENT Exam: Normal Exam





- Neck Exam


Neck Exam: Normal Inspection





- Respiratory Exam


Respiratory Exam: Clear to Ausculation Bilateral, NORMAL BREATHING PATTERN





- Cardiovascular Exam


Cardiovascular Exam: REGULAR RHYTHM





- GI/Abdominal Exam


GI & Abdominal Exam: Soft, Normal Bowel Sounds





- Rectal Exam


Rectal Exam: Deferred





- Extremities Exam


Additional comments: 





Weakness of both legs.





- Back Exam


Back Exam: NORMAL INSPECTION





- Neurological Exam


Neurological Exam: Abnormal Gait, Alert, Awake, Oriented x3





- Psychiatric Exam


Psychiatric exam: Anxious





- Skin


Skin Exam: Dry, Intact, Warm





Assessment and Plan


(1) Numbness and tingling in both hands


Assessment & Plan: 


Herniated cervical disc at S4-S5 impinging on the cervical spinal cord. For 

surgery on Monday.


Status: Chronic   





(2) Low back pain


Status: Acute   





(3) Weakness of both legs


Assessment & Plan: 


Herniated cervical disc impinging the cervical spinal cord.


Status: Acute

## 2018-09-28 NOTE — CP.PCM.PN
Subjective





- Date & Time of Evaluation


Date of Evaluation: 09/27/18


Time of Evaluation: 18:30





- Subjective


Subjective: 





Patient agrrees to have neck surgery at [facility] by Dr Omi Machado.





Objective





- Vital Signs/Intake and Output


Vital Signs (last 24 hours): 


                                        











Temp Pulse Resp BP Pulse Ox


 


 98.3 F   70   20   130/89   99 


 


 09/27/18 23:30  09/27/18 23:30  09/27/18 23:30  09/27/18 23:30  09/27/18 23:30








Intake and Output: 


                                        











 09/27/18 09/28/18





 18:59 06:59


 


Intake Total  300


 


Balance  300














- Medications


Medications: 


                               Current Medications





Bisacodyl (Dulcolax)  10 mg MA ONCE PRN


   PRN Reason: Constipation


Enoxaparin Sodium (Lovenox)  40 mg SC DAILY [facility]


   Last Admin: 09/27/18 17:08 Dose:  40 mg


Gabapentin (Neurontin)  300 mg PO BID [facility]


   Last Admin: 09/27/18 17:08 Dose:  300 mg


Glipizide (Glucotrol Xl)  5 mg PO DAILY [facility]


   Last Admin: 09/27/18 10:05 Dose:  5 mg


Insulin Aspart (Novolog)  0 unit SC [facility]; Protocol


   Last Admin: 09/27/18 21:17 Dose:  Not Given


Losartan Potassium (Cozaar)  50 mg PO DAILY [facility]


   Last Admin: 09/27/18 10:04 Dose:  50 mg


Metoprolol Tartrate (Lopressor)  100 mg PO DAILY [facility]


   Last Admin: 09/27/18 10:04 Dose:  100 mg


Rosuvastatin Calcium (Crestor)  20 mg PO [facility]


   Last Admin: 09/27/18 21:19 Dose:  20 mg


Sitagliptin Phosphate (Januvia)  100 mg PO DAILY [facility]


   Last Admin: 09/27/18 10:05 Dose:  100 mg











- Labs


Labs: 


                                        





                                 09/22/18 07:40 





                                 09/22/18 07:40 





                                        











PT  11.4 SECONDS (9.7-12.2)   09/20/18  03:58    


 


INR  1.0   09/20/18  03:58    


 


APTT  33 SECONDS (21-34)   09/20/18  03:58    














- Constitutional


Appears: Well, Chronically Ill





- Head Exam


Head Exam: NORMAL INSPECTION





- Eye Exam


Eye Exam: Normal appearance





- ENT Exam


ENT Exam: Normal Exam





- Neck Exam


Neck Exam: Normal Inspection





- Respiratory Exam


Respiratory Exam: Clear to Ausculation Bilateral, NORMAL BREATHING PATTERN





- Cardiovascular Exam


Cardiovascular Exam: REGULAR RHYTHM





- GI/Abdominal Exam


GI & Abdominal Exam: Soft, Normal Bowel Sounds





- Rectal Exam


Rectal Exam: Deferred





-  Exam


 Exam: NORMAL INSPECTION





- Extremities Exam


Extremities Exam: Normal Inspection


Additional comments: 





Mild weakness of the legs.





- Back Exam


Back Exam: NORMAL INSPECTION





- Neurological Exam


Neurological Exam: Abnormal Gait, Alert, Awake, Oriented x3





- Psychiatric Exam


Psychiatric exam: Anxious





- Skin


Skin Exam: Dry, Intact, Normal Color





Assessment and Plan


(1) Numbness and tingling in both hands


Assessment & Plan: 


Herniated C4-C5 cervical disc impinging the spinal cord. For neck surgey on 

Monday 10/1/2018 at [facility].


Status: Chronic   





(2) Low back pain


Status: Acute   





(3) Weakness of both legs


Assessment & Plan: 


Secondary to herniated C4-C5 cervical disc impinging the cervical spinal cord. 

For surgery on 10/1/2018 at [facility].


Status: Acute

## 2018-09-29 RX ADMIN — GLIPIZIDE SCH MG: 5 TABLET, EXTENDED RELEASE ORAL at 10:09

## 2018-09-29 RX ADMIN — ENOXAPARIN SODIUM SCH MG: 40 INJECTION SUBCUTANEOUS at 10:09

## 2018-09-29 RX ADMIN — INSULIN ASPART SCH UNITS: 100 INJECTION, SOLUTION INTRAVENOUS; SUBCUTANEOUS at 08:42

## 2018-09-29 RX ADMIN — INSULIN ASPART SCH UNITS: 100 INJECTION, SOLUTION INTRAVENOUS; SUBCUTANEOUS at 11:59

## 2018-09-29 RX ADMIN — INSULIN ASPART SCH UNITS: 100 INJECTION, SOLUTION INTRAVENOUS; SUBCUTANEOUS at 17:14

## 2018-09-29 RX ADMIN — INSULIN ASPART SCH: 100 INJECTION, SOLUTION INTRAVENOUS; SUBCUTANEOUS at 21:39

## 2018-09-29 NOTE — CP.PCM.PN
Subjective





- Date & Time of Evaluation


Date of Evaluation: 09/29/18


Time of Evaluation: 15:30





- Subjective


Subjective: 





Patient still with leg weakness. Awaiting transfer to [facility] for 

decompression of the cervical spinal cord.





Objective





- Vital Signs/Intake and Output


Vital Signs (last 24 hours): 


                                        











Temp Pulse Resp BP Pulse Ox


 


 98.1 F   78   20   123/72   99 


 


 09/29/18 14:00  09/29/18 14:00  09/29/18 14:00  09/29/18 14:00  09/29/18 14:00











- Medications


Medications: 


                               Current Medications





Bisacodyl (Dulcolax)  10 mg VT ONCE PRN


   PRN Reason: Constipation


Enoxaparin Sodium (Lovenox)  40 mg SC DAILY [facility]


   Last Admin: 09/29/18 10:09 Dose:  40 mg


Gabapentin (Neurontin)  300 mg PO BID [facility]


   Last Admin: 09/29/18 17:14 Dose:  300 mg


Glipizide (Glucotrol Xl)  5 mg PO DAILY [facility]


   Last Admin: 09/29/18 10:09 Dose:  5 mg


Insulin Aspart (Novolog)  0 unit SC [facility]; Protocol


   Last Admin: 09/29/18 21:39 Dose:  Not Given


Losartan Potassium (Cozaar)  50 mg PO DAILY [facility]


   Last Admin: 09/29/18 10:09 Dose:  50 mg


Metoprolol Tartrate (Lopressor)  100 mg PO DAILY [facility]


   Last Admin: 09/29/18 10:09 Dose:  100 mg


Rosuvastatin Calcium (Crestor)  20 mg PO [facility]


   Last Admin: 09/29/18 21:44 Dose:  20 mg


Sitagliptin Phosphate (Januvia)  100 mg PO DAILY [facility]


   Last Admin: 09/29/18 10:09 Dose:  100 mg











- Labs


Labs: 


                                        





                                 09/22/18 07:40 





                                 09/22/18 07:40 





                                        











PT  11.4 SECONDS (9.7-12.2)   09/20/18  03:58    


 


INR  1.0   09/20/18  03:58    


 


APTT  33 SECONDS (21-34)   09/20/18  03:58    














- Constitutional


Appears: Well, No Acute Distress





- Head Exam


Head Exam: NORMAL INSPECTION





- Eye Exam


Eye Exam: Normal appearance


Pupil Exam: NORMAL ACCOMODATION





- ENT Exam


ENT Exam: Normal Exam





- Neck Exam


Neck Exam: Normal Inspection





- Respiratory Exam


Respiratory Exam: Clear to Ausculation Bilateral, NORMAL BREATHING PATTERN





- Cardiovascular Exam


Cardiovascular Exam: REGULAR RHYTHM





- GI/Abdominal Exam


GI & Abdominal Exam: Soft, Normal Bowel Sounds





- Rectal Exam


Rectal Exam: Deferred





- Extremities Exam


Extremities Exam: Normal Inspection





- Back Exam


Back Exam: NORMAL INSPECTION





- Neurological Exam


Neurological Exam: Abnormal Gait, Alert, Awake, Oriented x3





- Psychiatric Exam


Psychiatric exam: Anxious





- Skin


Skin Exam: Dry, Intact, Normal Color, Warm





Assessment and Plan


(1) Numbness and tingling in both hands


Status: Chronic   





(2) Low back pain


Status: Acute   





(3) Weakness of both legs


Status: Acute

## 2018-09-30 RX ADMIN — INSULIN ASPART SCH UNITS: 100 INJECTION, SOLUTION INTRAVENOUS; SUBCUTANEOUS at 12:20

## 2018-09-30 RX ADMIN — GLIPIZIDE SCH MG: 5 TABLET, EXTENDED RELEASE ORAL at 09:32

## 2018-09-30 RX ADMIN — INSULIN ASPART SCH UNITS: 100 INJECTION, SOLUTION INTRAVENOUS; SUBCUTANEOUS at 19:00

## 2018-09-30 RX ADMIN — INSULIN ASPART SCH UNITS: 100 INJECTION, SOLUTION INTRAVENOUS; SUBCUTANEOUS at 08:42

## 2018-09-30 RX ADMIN — ENOXAPARIN SODIUM SCH MG: 40 INJECTION SUBCUTANEOUS at 09:32

## 2018-09-30 RX ADMIN — INSULIN ASPART SCH: 100 INJECTION, SOLUTION INTRAVENOUS; SUBCUTANEOUS at 21:53

## 2018-10-01 ENCOUNTER — INPATIENT (INPATIENT)
Facility: HOSPITAL | Age: 48
LOS: 5 days | Discharge: HOME | DRG: 472 | End: 2018-10-07
Attending: NEUROLOGICAL SURGERY | Admitting: NEUROLOGICAL SURGERY
Payer: COMMERCIAL

## 2018-10-01 VITALS
SYSTOLIC BLOOD PRESSURE: 149 MMHG | HEART RATE: 88 BPM | RESPIRATION RATE: 17 BRPM | TEMPERATURE: 98 F | OXYGEN SATURATION: 100 % | DIASTOLIC BLOOD PRESSURE: 81 MMHG

## 2018-10-01 VITALS — DIASTOLIC BLOOD PRESSURE: 79 MMHG | OXYGEN SATURATION: 98 % | HEART RATE: 85 BPM | SYSTOLIC BLOOD PRESSURE: 116 MMHG

## 2018-10-01 VITALS — TEMPERATURE: 98.3 F

## 2018-10-01 DIAGNOSIS — Z90.710 ACQUIRED ABSENCE OF BOTH CERVIX AND UTERUS: Chronic | ICD-10-CM

## 2018-10-01 LAB — GLUCOSE BLDC GLUCOMTR-MCNC: 180 MG/DL — HIGH (ref 70–99)

## 2018-10-01 PROCEDURE — 71045 X-RAY EXAM CHEST 1 VIEW: CPT | Mod: 26

## 2018-10-01 RX ORDER — DEXAMETHASONE 0.5 MG/5ML
4 ELIXIR ORAL EVERY 6 HOURS
Qty: 0 | Refills: 0 | Status: DISCONTINUED | OUTPATIENT
Start: 2018-10-01 | End: 2018-10-02

## 2018-10-01 RX ORDER — SODIUM CHLORIDE 9 MG/ML
1000 INJECTION, SOLUTION INTRAVENOUS
Qty: 0 | Refills: 0 | Status: DISCONTINUED | OUTPATIENT
Start: 2018-10-01 | End: 2018-10-05

## 2018-10-01 RX ORDER — SENNA PLUS 8.6 MG/1
2 TABLET ORAL AT BEDTIME
Qty: 0 | Refills: 0 | Status: DISCONTINUED | OUTPATIENT
Start: 2018-10-01 | End: 2018-10-05

## 2018-10-01 RX ORDER — PANTOPRAZOLE SODIUM 20 MG/1
40 TABLET, DELAYED RELEASE ORAL DAILY
Qty: 0 | Refills: 0 | Status: DISCONTINUED | OUTPATIENT
Start: 2018-10-01 | End: 2018-10-05

## 2018-10-01 RX ORDER — ENOXAPARIN SODIUM 100 MG/ML
40 INJECTION SUBCUTANEOUS EVERY 24 HOURS
Qty: 0 | Refills: 0 | Status: DISCONTINUED | OUTPATIENT
Start: 2018-10-02 | End: 2018-10-04

## 2018-10-01 RX ORDER — DEXTROSE 50 % IN WATER 50 %
15 SYRINGE (ML) INTRAVENOUS ONCE
Qty: 0 | Refills: 0 | Status: DISCONTINUED | OUTPATIENT
Start: 2018-10-01 | End: 2018-10-05

## 2018-10-01 RX ORDER — DIAZEPAM 5 MG
5 TABLET ORAL EVERY 8 HOURS
Qty: 0 | Refills: 0 | Status: DISCONTINUED | OUTPATIENT
Start: 2018-10-01 | End: 2018-10-05

## 2018-10-01 RX ORDER — DOCUSATE SODIUM 100 MG
100 CAPSULE ORAL THREE TIMES A DAY
Qty: 0 | Refills: 0 | Status: DISCONTINUED | OUTPATIENT
Start: 2018-10-01 | End: 2018-10-05

## 2018-10-01 RX ORDER — OXYCODONE AND ACETAMINOPHEN 5; 325 MG/1; MG/1
2 TABLET ORAL EVERY 6 HOURS
Qty: 0 | Refills: 0 | Status: DISCONTINUED | OUTPATIENT
Start: 2018-10-01 | End: 2018-10-05

## 2018-10-01 RX ORDER — INSULIN LISPRO 100/ML
VIAL (ML) SUBCUTANEOUS
Qty: 0 | Refills: 0 | Status: DISCONTINUED | OUTPATIENT
Start: 2018-10-01 | End: 2018-10-05

## 2018-10-01 RX ORDER — ACETAMINOPHEN 500 MG
650 TABLET ORAL EVERY 6 HOURS
Qty: 0 | Refills: 0 | Status: DISCONTINUED | OUTPATIENT
Start: 2018-10-01 | End: 2018-10-05

## 2018-10-01 RX ORDER — GLUCAGON INJECTION, SOLUTION 0.5 MG/.1ML
1 INJECTION, SOLUTION SUBCUTANEOUS ONCE
Qty: 0 | Refills: 0 | Status: DISCONTINUED | OUTPATIENT
Start: 2018-10-01 | End: 2018-10-05

## 2018-10-01 RX ORDER — GABAPENTIN 400 MG/1
300 CAPSULE ORAL EVERY 8 HOURS
Qty: 0 | Refills: 0 | Status: DISCONTINUED | OUTPATIENT
Start: 2018-10-01 | End: 2018-10-05

## 2018-10-01 RX ORDER — OXYCODONE AND ACETAMINOPHEN 5; 325 MG/1; MG/1
1 TABLET ORAL EVERY 4 HOURS
Qty: 0 | Refills: 0 | Status: DISCONTINUED | OUTPATIENT
Start: 2018-10-01 | End: 2018-10-05

## 2018-10-01 RX ORDER — ONDANSETRON 8 MG/1
4 TABLET, FILM COATED ORAL EVERY 6 HOURS
Qty: 0 | Refills: 0 | Status: DISCONTINUED | OUTPATIENT
Start: 2018-10-01 | End: 2018-10-05

## 2018-10-01 RX ORDER — DEXTROSE 50 % IN WATER 50 %
12.5 SYRINGE (ML) INTRAVENOUS ONCE
Qty: 0 | Refills: 0 | Status: DISCONTINUED | OUTPATIENT
Start: 2018-10-01 | End: 2018-10-05

## 2018-10-01 RX ADMIN — INSULIN ASPART SCH UNITS: 100 INJECTION, SOLUTION INTRAVENOUS; SUBCUTANEOUS at 09:34

## 2018-10-01 RX ADMIN — INSULIN ASPART SCH UNITS: 100 INJECTION, SOLUTION INTRAVENOUS; SUBCUTANEOUS at 17:49

## 2018-10-01 RX ADMIN — Medication 2: at 23:18

## 2018-10-01 RX ADMIN — ENOXAPARIN SODIUM SCH MG: 40 INJECTION SUBCUTANEOUS at 09:34

## 2018-10-01 RX ADMIN — GLIPIZIDE SCH MG: 5 TABLET, EXTENDED RELEASE ORAL at 09:34

## 2018-10-01 RX ADMIN — INSULIN ASPART SCH UNITS: 100 INJECTION, SOLUTION INTRAVENOUS; SUBCUTANEOUS at 11:57

## 2018-10-01 NOTE — CP.PCM.PN
Subjective





- Date & Time of Evaluation


Date of Evaluation: 10/01/18


Time of Evaluation: 13:45





- Subjective


Subjective: 





Patient complaining of mild pain in the left eye. No dizziness, no headache. 

Still with mild weakness of the legs. For transfer to [facility] today 

for a cervical cord decompression.





Objective





- Vital Signs/Intake and Output


Vital Signs (last 24 hours): 


                                        











Temp Pulse Resp BP Pulse Ox


 


 98.3 F   78   20   133/84   100 


 


 10/01/18 07:39  10/01/18 07:39  10/01/18 07:39  10/01/18 07:39  10/01/18 07:39











- Medications


Medications: 


                               Current Medications





Bisacodyl (Dulcolax)  10 mg NC ONCE PRN


   PRN Reason: Constipation


Enoxaparin Sodium (Lovenox)  40 mg SC DAILY [facility]


   Last Admin: 10/01/18 09:34 Dose:  40 mg


Gabapentin (Neurontin)  300 mg PO BID [facility]


   Last Admin: 10/01/18 09:34 Dose:  300 mg


Glipizide (Glucotrol Xl)  5 mg PO DAILY [facility]


   Last Admin: 10/01/18 09:34 Dose:  5 mg


Insulin Aspart (Novolog)  0 unit SC [facility]; Protocol


   Last Admin: 10/01/18 11:57 Dose:  4 units


Losartan Potassium (Cozaar)  50 mg PO DAILY [facility]


   Last Admin: 10/01/18 09:34 Dose:  50 mg


Metoprolol Tartrate (Lopressor)  100 mg PO DAILY [facility]


   Last Admin: 10/01/18 09:34 Dose:  100 mg


Oxycodone/Acetaminophen (Percocet 5/325 Mg Tab)  1 tab PO Q4H PRN


   PRN Reason: Pain, moderate (4-7)


   Stop: 10/03/18 08:06


   Last Admin: 09/30/18 08:42 Dose:  1 tab


Rosuvastatin Calcium (Crestor)  20 mg PO HS [facility]


   Last Admin: 09/30/18 21:54 Dose:  20 mg


Sitagliptin Phosphate (Januvia)  100 mg PO DAILY [facility]


   Last Admin: 10/01/18 09:34 Dose:  100 mg











- Labs


Labs: 


                                        





                                 09/22/18 07:40 





                                 09/22/18 07:40 





                                        











PT  11.4 SECONDS (9.7-12.2)   09/20/18  03:58    


 


INR  1.0   09/20/18  03:58    


 


APTT  33 SECONDS (21-34)   09/20/18  03:58    














- Constitutional


Appears: Well, No Acute Distress





- Head Exam


Head Exam: NORMAL INSPECTION





- Eye Exam


Eye Exam: Normal appearance


Pupil Exam: NORMAL ACCOMODATION





- ENT Exam


ENT Exam: Normal Exam





- Neck Exam


Neck Exam: Normal Inspection





- Respiratory Exam


Respiratory Exam: Clear to Ausculation Bilateral, NORMAL BREATHING PATTERN





- Cardiovascular Exam


Cardiovascular Exam: REGULAR RHYTHM





- GI/Abdominal Exam


GI & Abdominal Exam: Soft, Normal Bowel Sounds





- Rectal Exam


Rectal Exam: Deferred





- Extremities Exam


Additional comments: 





Mild weakness of the lower extremities.





- Neurological Exam


Neurological Exam: Abnormal Gait, Alert, Awake, Oriented x3





- Psychiatric Exam


Psychiatric exam: Anxious





- Skin


Skin Exam: Dry, Intact, Normal Color, Warm





Assessment and Plan


(1) Numbness and tingling in both hands


Assessment & Plan: 


Cervical cord compression from herniated cervical disc C4-C5. For a depression 

of the cervical cord at [facility] by Dr Omi Machado.


Status: Chronic   





(2) Low back pain


Status: Acute   





(3) Weakness of both legs


Assessment & Plan: 


From a cervical compression due to a herniated cervical disc C4-C5. To be 

transferred to [facility] for a cervical cord decompression.


Status: Acute

## 2018-10-01 NOTE — H&P ADULT - HISTORY OF PRESENT ILLNESS
47 y/o female pmhx DM, initially presented to OSH with CC chest pain 1 week ago and was admitted for work up. Upon furhter work up and complaints of unstable walking, patient was diagnosed with cervical stenosis most prominent at C45 level and decision was made to transfer to [facility] for surgical intervention. At OSH patient reported "burning" and "cramping" of both arms and legs with a recent fall form a ladder approx 2 weeks ago. Patient was started on steroids, gabapentin at OSH with slight improvement. MRI and imaging performed at OSH revealed signal change concerning for cord injury a/w cervical spondylolisthesis. Patient denies any b/b dysfunction. Denies any CP, SOB or difficulty breathing. Denies any paralysis, physical therapy treatment, or epidural injections for relief.

## 2018-10-01 NOTE — H&P ADULT - ASSESSMENT
47 y/o female pmhx DM admitted to OSH with initial CP work up negative diagnosed with HNP C45 and transferred to [facility] for sx intervention neuro stable  -admit to regional Dr. Goetz  -pre op for ACDF Weds or Friday  -Dr. Corona for clearance  -pre op testing  -pain control  -cont steroids, neurontin and pain control  -ekg, cxr, cervical non contrast MRI  -scds, lovenox ppx  -no abx  -d/w Dr. Goetz

## 2018-10-01 NOTE — H&P ADULT - NSHPPHYSICALEXAM_GEN_ALL_CORE
A&O x 3, comfortable  EOMI, PERRL  neg drift  soft cervical collar in place  MONROE x 4, b/l UE 4/5 throughout, biceps, triceps, wrist flexion/extension 4/5, neg han's  b/l LE HF 4/5, KF/KE 5/5, DF 4/5, PF 5/5, FHL 5/5, Lt EHL 5/5, Rt EHL 3/5  sensation intact throughout  reflexes intact throughout  abd soft NTND  RRR  CTA b/l

## 2018-10-02 DIAGNOSIS — E78.5 HYPERLIPIDEMIA, UNSPECIFIED: ICD-10-CM

## 2018-10-02 DIAGNOSIS — E11.9 TYPE 2 DIABETES MELLITUS WITHOUT COMPLICATIONS: ICD-10-CM

## 2018-10-02 DIAGNOSIS — I10 ESSENTIAL (PRIMARY) HYPERTENSION: ICD-10-CM

## 2018-10-02 DIAGNOSIS — Z01.818 ENCOUNTER FOR OTHER PREPROCEDURAL EXAMINATION: ICD-10-CM

## 2018-10-02 LAB
ALBUMIN SERPL ELPH-MCNC: 4.2 G/DL — SIGNIFICANT CHANGE UP (ref 3.3–5)
ALP SERPL-CCNC: 101 U/L — SIGNIFICANT CHANGE UP (ref 40–120)
ALT FLD-CCNC: 32 U/L — SIGNIFICANT CHANGE UP (ref 10–45)
ANION GAP SERPL CALC-SCNC: 15 MMOL/L — SIGNIFICANT CHANGE UP (ref 5–17)
APTT BLD: 29.6 SEC — SIGNIFICANT CHANGE UP (ref 27.5–37.4)
AST SERPL-CCNC: 17 U/L — SIGNIFICANT CHANGE UP (ref 10–40)
BASOPHILS NFR BLD AUTO: 0 % — SIGNIFICANT CHANGE UP (ref 0–2)
BILIRUB SERPL-MCNC: 0.7 MG/DL — SIGNIFICANT CHANGE UP (ref 0.2–1.2)
BUN SERPL-MCNC: 11 MG/DL — SIGNIFICANT CHANGE UP (ref 7–23)
CALCIUM SERPL-MCNC: 9.8 MG/DL — SIGNIFICANT CHANGE UP (ref 8.4–10.5)
CHLORIDE SERPL-SCNC: 94 MMOL/L — LOW (ref 96–108)
CO2 SERPL-SCNC: 24 MMOL/L — SIGNIFICANT CHANGE UP (ref 22–31)
CREAT SERPL-MCNC: 0.63 MG/DL — SIGNIFICANT CHANGE UP (ref 0.5–1.3)
EOSINOPHIL NFR BLD AUTO: 0.6 % — SIGNIFICANT CHANGE UP (ref 0–6)
GLUCOSE BLDC GLUCOMTR-MCNC: 298 MG/DL — HIGH (ref 70–99)
GLUCOSE BLDC GLUCOMTR-MCNC: 335 MG/DL — HIGH (ref 70–99)
GLUCOSE BLDC GLUCOMTR-MCNC: 340 MG/DL — HIGH (ref 70–99)
GLUCOSE BLDC GLUCOMTR-MCNC: 384 MG/DL — HIGH (ref 70–99)
GLUCOSE BLDC GLUCOMTR-MCNC: 385 MG/DL — HIGH (ref 70–99)
GLUCOSE SERPL-MCNC: 269 MG/DL — HIGH (ref 70–99)
HBA1C BLD-MCNC: 11.3 % — HIGH (ref 4–5.6)
HCT VFR BLD CALC: 42.7 % — SIGNIFICANT CHANGE UP (ref 34.5–45)
HGB BLD-MCNC: 14.2 G/DL — SIGNIFICANT CHANGE UP (ref 11.5–15.5)
INR BLD: 1.05 — SIGNIFICANT CHANGE UP (ref 0.88–1.16)
LYMPHOCYTES # BLD AUTO: 21.1 % — SIGNIFICANT CHANGE UP (ref 13–44)
MAGNESIUM SERPL-MCNC: 2 MG/DL — SIGNIFICANT CHANGE UP (ref 1.6–2.6)
MCHC RBC-ENTMCNC: 28 PG — SIGNIFICANT CHANGE UP (ref 27–34)
MCHC RBC-ENTMCNC: 33.3 G/DL — SIGNIFICANT CHANGE UP (ref 32–36)
MCV RBC AUTO: 84.2 FL — SIGNIFICANT CHANGE UP (ref 80–100)
MONOCYTES NFR BLD AUTO: 3.7 % — SIGNIFICANT CHANGE UP (ref 2–14)
NEUTROPHILS NFR BLD AUTO: 74.6 % — SIGNIFICANT CHANGE UP (ref 43–77)
PLATELET # BLD AUTO: 221 K/UL — SIGNIFICANT CHANGE UP (ref 150–400)
POTASSIUM SERPL-MCNC: 4.5 MMOL/L — SIGNIFICANT CHANGE UP (ref 3.5–5.3)
POTASSIUM SERPL-SCNC: 4.5 MMOL/L — SIGNIFICANT CHANGE UP (ref 3.5–5.3)
PROT SERPL-MCNC: 6.7 G/DL — SIGNIFICANT CHANGE UP (ref 6–8.3)
PROTHROM AB SERPL-ACNC: 11.7 SEC — SIGNIFICANT CHANGE UP (ref 9.8–12.7)
RBC # BLD: 5.07 M/UL — SIGNIFICANT CHANGE UP (ref 3.8–5.2)
RBC # FLD: 13 % — SIGNIFICANT CHANGE UP (ref 10.3–16.9)
SODIUM SERPL-SCNC: 133 MMOL/L — LOW (ref 135–145)
WBC # BLD: 8 K/UL — SIGNIFICANT CHANGE UP (ref 3.8–10.5)
WBC # FLD AUTO: 8 K/UL — SIGNIFICANT CHANGE UP (ref 3.8–10.5)

## 2018-10-02 PROCEDURE — 70551 MRI BRAIN STEM W/O DYE: CPT | Mod: 26

## 2018-10-02 PROCEDURE — 99223 1ST HOSP IP/OBS HIGH 75: CPT | Mod: GC

## 2018-10-02 PROCEDURE — 72141 MRI NECK SPINE W/O DYE: CPT | Mod: 26

## 2018-10-02 RX ORDER — INSULIN LISPRO 100/ML
10 VIAL (ML) SUBCUTANEOUS ONCE
Qty: 0 | Refills: 0 | Status: COMPLETED | OUTPATIENT
Start: 2018-10-02 | End: 2018-10-02

## 2018-10-02 RX ORDER — INSULIN GLARGINE 100 [IU]/ML
10 INJECTION, SOLUTION SUBCUTANEOUS AT BEDTIME
Qty: 0 | Refills: 0 | Status: DISCONTINUED | OUTPATIENT
Start: 2018-10-02 | End: 2018-10-04

## 2018-10-02 RX ORDER — DEXAMETHASONE 0.5 MG/5ML
4 ELIXIR ORAL EVERY 8 HOURS
Qty: 0 | Refills: 0 | Status: DISCONTINUED | OUTPATIENT
Start: 2018-10-02 | End: 2018-10-02

## 2018-10-02 RX ORDER — SODIUM CHLORIDE 9 MG/ML
1000 INJECTION INTRAMUSCULAR; INTRAVENOUS; SUBCUTANEOUS
Qty: 0 | Refills: 0 | Status: DISCONTINUED | OUTPATIENT
Start: 2018-10-02 | End: 2018-10-02

## 2018-10-02 RX ORDER — INSULIN HUMAN 100 [IU]/ML
10 INJECTION, SOLUTION SUBCUTANEOUS ONCE
Qty: 0 | Refills: 0 | Status: DISCONTINUED | OUTPATIENT
Start: 2018-10-02 | End: 2018-10-02

## 2018-10-02 RX ORDER — FAMOTIDINE 10 MG/ML
20 INJECTION INTRAVENOUS ONCE
Qty: 0 | Refills: 0 | Status: COMPLETED | OUTPATIENT
Start: 2018-10-02 | End: 2018-10-02

## 2018-10-02 RX ORDER — INSULIN LISPRO 100/ML
4 VIAL (ML) SUBCUTANEOUS
Qty: 0 | Refills: 0 | Status: DISCONTINUED | OUTPATIENT
Start: 2018-10-02 | End: 2018-10-05

## 2018-10-02 RX ORDER — LISINOPRIL 2.5 MG/1
2.5 TABLET ORAL DAILY
Qty: 0 | Refills: 0 | Status: DISCONTINUED | OUTPATIENT
Start: 2018-10-02 | End: 2018-10-05

## 2018-10-02 RX ADMIN — Medication 4 MILLIGRAM(S): at 02:27

## 2018-10-02 RX ADMIN — OXYCODONE AND ACETAMINOPHEN 1 TABLET(S): 5; 325 TABLET ORAL at 02:57

## 2018-10-02 RX ADMIN — Medication 100 MILLIGRAM(S): at 05:48

## 2018-10-02 RX ADMIN — Medication 10 UNIT(S): at 12:35

## 2018-10-02 RX ADMIN — ENOXAPARIN SODIUM 40 MILLIGRAM(S): 100 INJECTION SUBCUTANEOUS at 05:48

## 2018-10-02 RX ADMIN — INSULIN GLARGINE 10 UNIT(S): 100 INJECTION, SOLUTION SUBCUTANEOUS at 23:00

## 2018-10-02 RX ADMIN — Medication 8: at 17:46

## 2018-10-02 RX ADMIN — Medication 6: at 08:02

## 2018-10-02 RX ADMIN — GABAPENTIN 300 MILLIGRAM(S): 400 CAPSULE ORAL at 23:00

## 2018-10-02 RX ADMIN — Medication 8: at 23:00

## 2018-10-02 RX ADMIN — Medication 2 MILLIGRAM(S): at 21:06

## 2018-10-02 RX ADMIN — OXYCODONE AND ACETAMINOPHEN 1 TABLET(S): 5; 325 TABLET ORAL at 02:29

## 2018-10-02 RX ADMIN — Medication 4 MILLIGRAM(S): at 14:32

## 2018-10-02 RX ADMIN — GABAPENTIN 300 MILLIGRAM(S): 400 CAPSULE ORAL at 05:48

## 2018-10-02 RX ADMIN — Medication 100 MILLIGRAM(S): at 23:00

## 2018-10-02 RX ADMIN — Medication 4 MILLIGRAM(S): at 08:02

## 2018-10-02 RX ADMIN — FAMOTIDINE 20 MILLIGRAM(S): 10 INJECTION INTRAVENOUS at 23:00

## 2018-10-02 RX ADMIN — GABAPENTIN 300 MILLIGRAM(S): 400 CAPSULE ORAL at 13:45

## 2018-10-02 NOTE — CONSULT NOTE ADULT - SUBJECTIVE AND OBJECTIVE BOX
Patient is a 48y old  Female who presents with a chief complaint of s/p fall with neck pain (02 Oct 2018 09:08)      HPI:  47 y/o female pmhx DM, initially presented to OSH with CC chest pain 1 week ago and was admitted for work up. Upon furhter work up and complaints of unstable walking, patient was diagnosed with cervical stenosis most prominent at C45 level and decision was made to transfer to [facility] for surgical intervention. At OSH patient reported "burning" and "cramping" of both arms and legs with a recent fall form a ladder approx 2 weeks ago. Patient was started on steroids, gabapentin at OSH with slight improvement. MRI and imaging performed at OSH revealed signal change concerning for cord injury a/w cervical spondylolisthesis. Patient denies any b/b dysfunction. Denies any CP, SOB or difficulty breathing. Denies any paralysis, physical therapy treatment, or epidural injections for relief. (01 Oct 2018 22:10)      PAST MEDICAL & SURGICAL HISTORY:  Type 2 diabetes mellitus without complication, unspecified whether long term insulin use  H/O abdominal hysterectomy      FAMILY HISTORY:  No pertinent family history in first degree relatives      SOCIAL HISTORY:  Smoking Status: [ ] Current, [ ] Former, [x ] Never  Pack Years:    MEDICATIONS:  Pulmonary:    Antimicrobials:    Anticoagulants:  enoxaparin Injectable 40 milliGRAM(s) SubCutaneous every 24 hours    Onc:    GI/:  bisacodyl 5 milliGRAM(s) Oral daily PRN  docusate sodium 100 milliGRAM(s) Oral three times a day  famotidine    Tablet 20 milliGRAM(s) Oral once  pantoprazole    Tablet 40 milliGRAM(s) Oral daily  senna 2 Tablet(s) Oral at bedtime PRN    Endocrine:  dextrose 40% Gel 15 Gram(s) Oral once PRN  dextrose 50% Injectable 12.5 Gram(s) IV Push once  glucagon  Injectable 1 milliGRAM(s) IntraMuscular once PRN  insulin lispro (HumaLOG) corrective regimen sliding scale   SubCutaneous Before meals and at bedtime    Cardiac:    Other Medications:  acetaminophen   Tablet .. 650 milliGRAM(s) Oral every 6 hours PRN  dextrose 5%. 1000 milliLiter(s) IV Continuous <Continuous>  diazepam    Tablet 5 milliGRAM(s) Oral every 8 hours PRN  gabapentin 300 milliGRAM(s) Oral every 8 hours  ondansetron Injectable 4 milliGRAM(s) IV Push every 6 hours PRN  oxyCODONE    5 mG/acetaminophen 325 mG 1 Tablet(s) Oral every 4 hours PRN  oxyCODONE    5 mG/acetaminophen 325 mG 2 Tablet(s) Oral every 6 hours PRN      Allergies    No Known Allergies    Intolerances        Vital Signs Last 24 Hrs  T(C): 37.1 (02 Oct 2018 17:33), Max: 37.1 (02 Oct 2018 17:33)  T(F): 98.8 (02 Oct 2018 17:33), Max: 98.8 (02 Oct 2018 17:33)  HR: 118 (02 Oct 2018 17:33) (81 - 118)  BP: 161/90 (02 Oct 2018 17:33) (111/73 - 161/90)  BP(mean): --  RR: 17 (02 Oct 2018 17:33) (16 - 17)  SpO2: 99% (02 Oct 2018 17:33) (96% - 100%)    10-01 @ 07:01  -  10-02 @ 07:00  --------------------------------------------------------  IN: 0 mL / OUT: 300 mL / NET: -300 mL    10-02 @ 07:01  -  10-02 @ 21:30  --------------------------------------------------------  IN: 500 mL / OUT: 950 mL / NET: -450 mL          LABS:      CBC Full  -  ( 02 Oct 2018 06:40 )  WBC Count : 8.0 K/uL  Hemoglobin : 14.2 g/dL  Hematocrit : 42.7 %  Platelet Count - Automated : 221 K/uL  Mean Cell Volume : 84.2 fL  Mean Cell Hemoglobin : 28.0 pg  Mean Cell Hemoglobin Concentration : 33.3 g/dL  Auto Neutrophil # : x  Auto Lymphocyte # : x  Auto Monocyte # : x  Auto Eosinophil # : x  Auto Basophil # : x  Auto Neutrophil % : 74.6 %  Auto Lymphocyte % : 21.1 %  Auto Monocyte % : 3.7 %  Auto Eosinophil % : 0.6 %  Auto Basophil % : 0.0 %    10-02    133<L>  |  94<L>  |  11  ----------------------------<  269<H>  4.5   |  24  |  0.63    Ca    9.8      02 Oct 2018 06:40  Mg     2.0     10-02    TPro  6.7  /  Alb  4.2  /  TBili  0.7  /  DBili  x   /  AST  17  /  ALT  32  /  AlkPhos  101  10-02    PT/INR - ( 02 Oct 2018 06:40 )   PT: 11.7 sec;   INR: 1.05          PTT - ( 02 Oct 2018 06:40 )  PTT:29.6 sec    < from: Xray Chest 1 View AP/PA (10.01.18 @ 22:28) >    EXAM:  XR CHEST AP OR PA 1V                          PROCEDURE DATE:  10/01/2018          INTERPRETATION:  Chest single view exam    History: Abnormal breath sounds preop trauma patient fell patient unable   to communicate    Hypoinflation. Lungs clear. No infiltrate or pleural effusion.        < end of copied text >                  RADIOLOGY & ADDITIONAL STUDIES (The following images were personally reviewed):

## 2018-10-02 NOTE — CONSULT NOTE ADULT - PROBLEM SELECTOR RECOMMENDATION 4
The patient's medical condition is optimized for surgery.  There is no contraindication for surgery.  There is no clinical evidence neither of angina, decompensated CHF, arrhthymias, nor valvular disease.   There is no limitation of exercise capacity.  MET is 5 .  ASA class is 2.  May cardiac risk factor is low .  DVT prophylaxis is indicated.  Pain control.  Early mobilization.  Avoid fluid overload.  follow on echo

## 2018-10-02 NOTE — CONSULT NOTE ADULT - PROBLEM SELECTOR RECOMMENDATION 9
Blood sugar is uncontrolled. The hemoglobin A1c is 11. Her blood sugar is more elevated to the systemic steroids which was discontinue it today. Blood sugar need to be controlled in the range between 140/180 and not before 70. Start pre-meals insulin and Lantus and night. No evidence of DKA

## 2018-10-02 NOTE — PATIENT PROFILE ADULT. - NS TRANSFER PATIENT BELONGINGS
Clothing/laptop, ID, credit card/Other belongings/Electronic Device (specify)/Cell Phone/PDA (specify)

## 2018-10-03 LAB
ALBUMIN SERPL ELPH-MCNC: 4.2 G/DL — SIGNIFICANT CHANGE UP (ref 3.3–5)
ALP SERPL-CCNC: 92 U/L — SIGNIFICANT CHANGE UP (ref 40–120)
ALT FLD-CCNC: 27 U/L — SIGNIFICANT CHANGE UP (ref 10–45)
ANION GAP SERPL CALC-SCNC: 21 MMOL/L — HIGH (ref 5–17)
APPEARANCE UR: CLEAR — SIGNIFICANT CHANGE UP
APTT BLD: 42 SEC — HIGH (ref 27.5–37.4)
APTT BLD: >200 SEC — CRITICAL HIGH (ref 27.5–37.4)
AST SERPL-CCNC: 15 U/L — SIGNIFICANT CHANGE UP (ref 10–40)
BILIRUB SERPL-MCNC: 0.8 MG/DL — SIGNIFICANT CHANGE UP (ref 0.2–1.2)
BILIRUB UR-MCNC: NEGATIVE — SIGNIFICANT CHANGE UP
BUN SERPL-MCNC: 17 MG/DL — SIGNIFICANT CHANGE UP (ref 7–23)
CALCIUM SERPL-MCNC: 10.2 MG/DL — SIGNIFICANT CHANGE UP (ref 8.4–10.5)
CHLORIDE SERPL-SCNC: 94 MMOL/L — LOW (ref 96–108)
CO2 SERPL-SCNC: 20 MMOL/L — LOW (ref 22–31)
COLOR SPEC: YELLOW — SIGNIFICANT CHANGE UP
CREAT SERPL-MCNC: 0.71 MG/DL — SIGNIFICANT CHANGE UP (ref 0.5–1.3)
DIFF PNL FLD: NEGATIVE — SIGNIFICANT CHANGE UP
GLUCOSE BLDC GLUCOMTR-MCNC: 206 MG/DL — HIGH (ref 70–99)
GLUCOSE BLDC GLUCOMTR-MCNC: 214 MG/DL — HIGH (ref 70–99)
GLUCOSE BLDC GLUCOMTR-MCNC: 222 MG/DL — HIGH (ref 70–99)
GLUCOSE BLDC GLUCOMTR-MCNC: 224 MG/DL — HIGH (ref 70–99)
GLUCOSE BLDC GLUCOMTR-MCNC: 308 MG/DL — HIGH (ref 70–99)
GLUCOSE SERPL-MCNC: 260 MG/DL — HIGH (ref 70–99)
GLUCOSE UR QL: 100
HCG SERPL-ACNC: 1.2 MIU/ML — SIGNIFICANT CHANGE UP
HCT VFR BLD CALC: 42.9 % — SIGNIFICANT CHANGE UP (ref 34.5–45)
HGB BLD-MCNC: 14.2 G/DL — SIGNIFICANT CHANGE UP (ref 11.5–15.5)
INR BLD: 1.19 — HIGH (ref 0.88–1.16)
INR BLD: >24 — CRITICAL HIGH (ref 0.88–1.16)
KETONES UR-MCNC: ABNORMAL MG/DL
LEUKOCYTE ESTERASE UR-ACNC: NEGATIVE — SIGNIFICANT CHANGE UP
MCHC RBC-ENTMCNC: 28 PG — SIGNIFICANT CHANGE UP (ref 27–34)
MCHC RBC-ENTMCNC: 33.1 G/DL — SIGNIFICANT CHANGE UP (ref 32–36)
MCV RBC AUTO: 84.4 FL — SIGNIFICANT CHANGE UP (ref 80–100)
MRSA PCR RESULT.: NEGATIVE — SIGNIFICANT CHANGE UP
NITRITE UR-MCNC: NEGATIVE — SIGNIFICANT CHANGE UP
PH UR: 5.5 — SIGNIFICANT CHANGE UP (ref 5–8)
PLATELET # BLD AUTO: 234 K/UL — SIGNIFICANT CHANGE UP (ref 150–400)
POTASSIUM SERPL-MCNC: 4.5 MMOL/L — SIGNIFICANT CHANGE UP (ref 3.5–5.3)
POTASSIUM SERPL-SCNC: 4.5 MMOL/L — SIGNIFICANT CHANGE UP (ref 3.5–5.3)
PROT SERPL-MCNC: 7.1 G/DL — SIGNIFICANT CHANGE UP (ref 6–8.3)
PROT UR-MCNC: NEGATIVE MG/DL — SIGNIFICANT CHANGE UP
PROTHROM AB SERPL-ACNC: 13.3 SEC — HIGH (ref 9.8–12.7)
PROTHROM AB SERPL-ACNC: >320 SEC — HIGH (ref 9.8–12.7)
RBC # BLD: 5.08 M/UL — SIGNIFICANT CHANGE UP (ref 3.8–5.2)
RBC # FLD: 12.8 % — SIGNIFICANT CHANGE UP (ref 10.3–16.9)
S AUREUS DNA NOSE QL NAA+PROBE: NEGATIVE — SIGNIFICANT CHANGE UP
SODIUM SERPL-SCNC: 135 MMOL/L — SIGNIFICANT CHANGE UP (ref 135–145)
SP GR SPEC: 1.02 — SIGNIFICANT CHANGE UP (ref 1–1.03)
UROBILINOGEN FLD QL: 0.2 E.U./DL — SIGNIFICANT CHANGE UP
WBC # BLD: 12.9 K/UL — HIGH (ref 3.8–10.5)
WBC # FLD AUTO: 12.9 K/UL — HIGH (ref 3.8–10.5)

## 2018-10-03 PROCEDURE — 93306 TTE W/DOPPLER COMPLETE: CPT | Mod: 26

## 2018-10-03 PROCEDURE — 99232 SBSQ HOSP IP/OBS MODERATE 35: CPT | Mod: GC

## 2018-10-03 RX ADMIN — PANTOPRAZOLE SODIUM 40 MILLIGRAM(S): 20 TABLET, DELAYED RELEASE ORAL at 12:07

## 2018-10-03 RX ADMIN — Medication 4 UNIT(S): at 12:08

## 2018-10-03 RX ADMIN — Medication 100 MILLIGRAM(S): at 13:45

## 2018-10-03 RX ADMIN — Medication 8: at 07:09

## 2018-10-03 RX ADMIN — ENOXAPARIN SODIUM 40 MILLIGRAM(S): 100 INJECTION SUBCUTANEOUS at 06:14

## 2018-10-03 RX ADMIN — SENNA PLUS 2 TABLET(S): 8.6 TABLET ORAL at 22:24

## 2018-10-03 RX ADMIN — GABAPENTIN 300 MILLIGRAM(S): 400 CAPSULE ORAL at 06:14

## 2018-10-03 RX ADMIN — Medication 100 MILLIGRAM(S): at 06:14

## 2018-10-03 RX ADMIN — Medication 4 UNIT(S): at 17:47

## 2018-10-03 RX ADMIN — GABAPENTIN 300 MILLIGRAM(S): 400 CAPSULE ORAL at 13:44

## 2018-10-03 RX ADMIN — Medication 4: at 12:08

## 2018-10-03 RX ADMIN — GABAPENTIN 300 MILLIGRAM(S): 400 CAPSULE ORAL at 22:24

## 2018-10-03 RX ADMIN — Medication 4: at 22:25

## 2018-10-03 RX ADMIN — LISINOPRIL 2.5 MILLIGRAM(S): 2.5 TABLET ORAL at 06:14

## 2018-10-03 RX ADMIN — Medication 4 UNIT(S): at 07:09

## 2018-10-03 RX ADMIN — INSULIN GLARGINE 10 UNIT(S): 100 INJECTION, SOLUTION SUBCUTANEOUS at 22:24

## 2018-10-03 RX ADMIN — Medication 4: at 17:48

## 2018-10-03 RX ADMIN — Medication 100 MILLIGRAM(S): at 22:24

## 2018-10-04 LAB
ANION GAP SERPL CALC-SCNC: 14 MMOL/L — SIGNIFICANT CHANGE UP (ref 5–17)
APTT BLD: 30.4 SEC — SIGNIFICANT CHANGE UP (ref 27.5–37.4)
BUN SERPL-MCNC: 22 MG/DL — SIGNIFICANT CHANGE UP (ref 7–23)
CALCIUM SERPL-MCNC: 9.4 MG/DL — SIGNIFICANT CHANGE UP (ref 8.4–10.5)
CHLORIDE SERPL-SCNC: 101 MMOL/L — SIGNIFICANT CHANGE UP (ref 96–108)
CO2 SERPL-SCNC: 25 MMOL/L — SIGNIFICANT CHANGE UP (ref 22–31)
CREAT SERPL-MCNC: 0.84 MG/DL — SIGNIFICANT CHANGE UP (ref 0.5–1.3)
GLUCOSE BLDC GLUCOMTR-MCNC: 171 MG/DL — HIGH (ref 70–99)
GLUCOSE BLDC GLUCOMTR-MCNC: 181 MG/DL — HIGH (ref 70–99)
GLUCOSE BLDC GLUCOMTR-MCNC: 182 MG/DL — HIGH (ref 70–99)
GLUCOSE BLDC GLUCOMTR-MCNC: 226 MG/DL — HIGH (ref 70–99)
GLUCOSE SERPL-MCNC: 148 MG/DL — HIGH (ref 70–99)
HCT VFR BLD CALC: 39.9 % — SIGNIFICANT CHANGE UP (ref 34.5–45)
HGB BLD-MCNC: 13.2 G/DL — SIGNIFICANT CHANGE UP (ref 11.5–15.5)
INR BLD: 1.07 — SIGNIFICANT CHANGE UP (ref 0.88–1.16)
MCHC RBC-ENTMCNC: 28.1 PG — SIGNIFICANT CHANGE UP (ref 27–34)
MCHC RBC-ENTMCNC: 33.1 G/DL — SIGNIFICANT CHANGE UP (ref 32–36)
MCV RBC AUTO: 84.9 FL — SIGNIFICANT CHANGE UP (ref 80–100)
PLATELET # BLD AUTO: 201 K/UL — SIGNIFICANT CHANGE UP (ref 150–400)
POTASSIUM SERPL-MCNC: 4.3 MMOL/L — SIGNIFICANT CHANGE UP (ref 3.5–5.3)
POTASSIUM SERPL-SCNC: 4.3 MMOL/L — SIGNIFICANT CHANGE UP (ref 3.5–5.3)
PROTHROM AB SERPL-ACNC: 11.9 SEC — SIGNIFICANT CHANGE UP (ref 9.8–12.7)
RBC # BLD: 4.7 M/UL — SIGNIFICANT CHANGE UP (ref 3.8–5.2)
RBC # FLD: 13.2 % — SIGNIFICANT CHANGE UP (ref 10.3–16.9)
SODIUM SERPL-SCNC: 140 MMOL/L — SIGNIFICANT CHANGE UP (ref 135–145)
WBC # BLD: 10.6 K/UL — HIGH (ref 3.8–10.5)
WBC # FLD AUTO: 10.6 K/UL — HIGH (ref 3.8–10.5)

## 2018-10-04 PROCEDURE — 99232 SBSQ HOSP IP/OBS MODERATE 35: CPT | Mod: GC

## 2018-10-04 RX ORDER — INSULIN GLARGINE 100 [IU]/ML
5 INJECTION, SOLUTION SUBCUTANEOUS AT BEDTIME
Qty: 0 | Refills: 0 | Status: DISCONTINUED | OUTPATIENT
Start: 2018-10-04 | End: 2018-10-05

## 2018-10-04 RX ORDER — SODIUM CHLORIDE 9 MG/ML
1000 INJECTION INTRAMUSCULAR; INTRAVENOUS; SUBCUTANEOUS
Qty: 0 | Refills: 0 | Status: DISCONTINUED | OUTPATIENT
Start: 2018-10-04 | End: 2018-10-05

## 2018-10-04 RX ADMIN — Medication 4 UNIT(S): at 12:24

## 2018-10-04 RX ADMIN — Medication 2: at 07:55

## 2018-10-04 RX ADMIN — Medication 2: at 21:32

## 2018-10-04 RX ADMIN — ENOXAPARIN SODIUM 40 MILLIGRAM(S): 100 INJECTION SUBCUTANEOUS at 05:37

## 2018-10-04 RX ADMIN — LISINOPRIL 2.5 MILLIGRAM(S): 2.5 TABLET ORAL at 05:37

## 2018-10-04 RX ADMIN — Medication 100 MILLIGRAM(S): at 05:37

## 2018-10-04 RX ADMIN — Medication 4 UNIT(S): at 07:54

## 2018-10-04 RX ADMIN — OXYCODONE AND ACETAMINOPHEN 1 TABLET(S): 5; 325 TABLET ORAL at 23:39

## 2018-10-04 RX ADMIN — GABAPENTIN 300 MILLIGRAM(S): 400 CAPSULE ORAL at 05:37

## 2018-10-04 RX ADMIN — Medication 2: at 17:24

## 2018-10-04 RX ADMIN — Medication 4: at 12:23

## 2018-10-04 RX ADMIN — Medication 5 MILLIGRAM(S): at 17:25

## 2018-10-04 RX ADMIN — PANTOPRAZOLE SODIUM 40 MILLIGRAM(S): 20 TABLET, DELAYED RELEASE ORAL at 12:24

## 2018-10-04 RX ADMIN — INSULIN GLARGINE 5 UNIT(S): 100 INJECTION, SOLUTION SUBCUTANEOUS at 21:33

## 2018-10-04 RX ADMIN — GABAPENTIN 300 MILLIGRAM(S): 400 CAPSULE ORAL at 13:38

## 2018-10-04 RX ADMIN — Medication 4 UNIT(S): at 17:24

## 2018-10-04 RX ADMIN — GABAPENTIN 300 MILLIGRAM(S): 400 CAPSULE ORAL at 21:31

## 2018-10-05 LAB
BLD GP AB SCN SERPL QL: NEGATIVE — SIGNIFICANT CHANGE UP
GLUCOSE BLDC GLUCOMTR-MCNC: 160 MG/DL — HIGH (ref 70–99)
GLUCOSE BLDC GLUCOMTR-MCNC: 190 MG/DL — HIGH (ref 70–99)
GLUCOSE BLDC GLUCOMTR-MCNC: 196 MG/DL — HIGH (ref 70–99)
GLUCOSE BLDC GLUCOMTR-MCNC: 197 MG/DL — HIGH (ref 70–99)
GLUCOSE BLDC GLUCOMTR-MCNC: 216 MG/DL — HIGH (ref 70–99)
RH IG SCN BLD-IMP: POSITIVE — SIGNIFICANT CHANGE UP

## 2018-10-05 RX ORDER — FAMOTIDINE 10 MG/ML
20 INJECTION INTRAVENOUS ONCE
Qty: 0 | Refills: 0 | Status: DISCONTINUED | OUTPATIENT
Start: 2018-10-05 | End: 2018-10-07

## 2018-10-05 RX ORDER — INSULIN HUMAN 100 [IU]/ML
10 INJECTION, SOLUTION SUBCUTANEOUS ONCE
Qty: 0 | Refills: 0 | Status: DISCONTINUED | OUTPATIENT
Start: 2018-10-05 | End: 2018-10-05

## 2018-10-05 RX ORDER — OXYCODONE AND ACETAMINOPHEN 5; 325 MG/1; MG/1
2 TABLET ORAL EVERY 6 HOURS
Qty: 0 | Refills: 0 | Status: DISCONTINUED | OUTPATIENT
Start: 2018-10-05 | End: 2018-10-07

## 2018-10-05 RX ORDER — PANTOPRAZOLE SODIUM 20 MG/1
40 TABLET, DELAYED RELEASE ORAL DAILY
Qty: 0 | Refills: 0 | Status: DISCONTINUED | OUTPATIENT
Start: 2018-10-05 | End: 2018-10-05

## 2018-10-05 RX ORDER — MORPHINE SULFATE 50 MG/1
2 CAPSULE, EXTENDED RELEASE ORAL ONCE
Qty: 0 | Refills: 0 | Status: DISCONTINUED | OUTPATIENT
Start: 2018-10-05 | End: 2018-10-05

## 2018-10-05 RX ORDER — MORPHINE SULFATE 50 MG/1
2 CAPSULE, EXTENDED RELEASE ORAL
Qty: 0 | Refills: 0 | Status: DISCONTINUED | OUTPATIENT
Start: 2018-10-05 | End: 2018-10-06

## 2018-10-05 RX ORDER — ONDANSETRON 8 MG/1
4 TABLET, FILM COATED ORAL EVERY 6 HOURS
Qty: 0 | Refills: 0 | Status: DISCONTINUED | OUTPATIENT
Start: 2018-10-05 | End: 2018-10-07

## 2018-10-05 RX ORDER — INSULIN LISPRO 100/ML
4 VIAL (ML) SUBCUTANEOUS
Qty: 0 | Refills: 0 | Status: DISCONTINUED | OUTPATIENT
Start: 2018-10-05 | End: 2018-10-06

## 2018-10-05 RX ORDER — GABAPENTIN 400 MG/1
300 CAPSULE ORAL EVERY 8 HOURS
Qty: 0 | Refills: 0 | Status: DISCONTINUED | OUTPATIENT
Start: 2018-10-05 | End: 2018-10-07

## 2018-10-05 RX ORDER — ACETAMINOPHEN 500 MG
975 TABLET ORAL EVERY 6 HOURS
Qty: 0 | Refills: 0 | Status: DISCONTINUED | OUTPATIENT
Start: 2018-10-05 | End: 2018-10-07

## 2018-10-05 RX ORDER — DIAZEPAM 5 MG
5 TABLET ORAL EVERY 8 HOURS
Qty: 0 | Refills: 0 | Status: DISCONTINUED | OUTPATIENT
Start: 2018-10-05 | End: 2018-10-07

## 2018-10-05 RX ORDER — GLUCAGON INJECTION, SOLUTION 0.5 MG/.1ML
1 INJECTION, SOLUTION SUBCUTANEOUS ONCE
Qty: 0 | Refills: 0 | Status: DISCONTINUED | OUTPATIENT
Start: 2018-10-05 | End: 2018-10-07

## 2018-10-05 RX ORDER — FAMOTIDINE 10 MG/ML
20 INJECTION INTRAVENOUS
Qty: 0 | Refills: 0 | Status: DISCONTINUED | OUTPATIENT
Start: 2018-10-05 | End: 2018-10-05

## 2018-10-05 RX ORDER — DEXAMETHASONE 0.5 MG/5ML
4 ELIXIR ORAL EVERY 8 HOURS
Qty: 0 | Refills: 0 | Status: DISCONTINUED | OUTPATIENT
Start: 2018-10-05 | End: 2018-10-05

## 2018-10-05 RX ORDER — INSULIN LISPRO 100/ML
VIAL (ML) SUBCUTANEOUS
Qty: 0 | Refills: 0 | Status: DISCONTINUED | OUTPATIENT
Start: 2018-10-05 | End: 2018-10-07

## 2018-10-05 RX ORDER — DOCUSATE SODIUM 100 MG
100 CAPSULE ORAL THREE TIMES A DAY
Qty: 0 | Refills: 0 | Status: DISCONTINUED | OUTPATIENT
Start: 2018-10-05 | End: 2018-10-07

## 2018-10-05 RX ORDER — HYDRALAZINE HCL 50 MG
10 TABLET ORAL EVERY 6 HOURS
Qty: 0 | Refills: 0 | Status: DISCONTINUED | OUTPATIENT
Start: 2018-10-05 | End: 2018-10-07

## 2018-10-05 RX ORDER — DEXTROSE 50 % IN WATER 50 %
15 SYRINGE (ML) INTRAVENOUS ONCE
Qty: 0 | Refills: 0 | Status: DISCONTINUED | OUTPATIENT
Start: 2018-10-05 | End: 2018-10-07

## 2018-10-05 RX ORDER — LISINOPRIL 2.5 MG/1
2.5 TABLET ORAL DAILY
Qty: 0 | Refills: 0 | Status: DISCONTINUED | OUTPATIENT
Start: 2018-10-05 | End: 2018-10-06

## 2018-10-05 RX ORDER — SENNA PLUS 8.6 MG/1
2 TABLET ORAL AT BEDTIME
Qty: 0 | Refills: 0 | Status: DISCONTINUED | OUTPATIENT
Start: 2018-10-05 | End: 2018-10-07

## 2018-10-05 RX ORDER — INSULIN GLARGINE 100 [IU]/ML
10 INJECTION, SOLUTION SUBCUTANEOUS AT BEDTIME
Qty: 0 | Refills: 0 | Status: DISCONTINUED | OUTPATIENT
Start: 2018-10-05 | End: 2018-10-06

## 2018-10-05 RX ORDER — DEXTROSE 50 % IN WATER 50 %
12.5 SYRINGE (ML) INTRAVENOUS ONCE
Qty: 0 | Refills: 0 | Status: DISCONTINUED | OUTPATIENT
Start: 2018-10-05 | End: 2018-10-07

## 2018-10-05 RX ORDER — SODIUM CHLORIDE 9 MG/ML
1000 INJECTION INTRAMUSCULAR; INTRAVENOUS; SUBCUTANEOUS
Qty: 0 | Refills: 0 | Status: DISCONTINUED | OUTPATIENT
Start: 2018-10-05 | End: 2018-10-06

## 2018-10-05 RX ORDER — INSULIN LISPRO 100/ML
10 VIAL (ML) SUBCUTANEOUS ONCE
Qty: 0 | Refills: 0 | Status: DISCONTINUED | OUTPATIENT
Start: 2018-10-05 | End: 2018-10-06

## 2018-10-05 RX ORDER — BENZOCAINE AND MENTHOL 5; 1 G/100ML; G/100ML
1 LIQUID ORAL
Qty: 0 | Refills: 0 | Status: DISCONTINUED | OUTPATIENT
Start: 2018-10-05 | End: 2018-10-07

## 2018-10-05 RX ORDER — OXYCODONE AND ACETAMINOPHEN 5; 325 MG/1; MG/1
1 TABLET ORAL EVERY 4 HOURS
Qty: 0 | Refills: 0 | Status: DISCONTINUED | OUTPATIENT
Start: 2018-10-05 | End: 2018-10-07

## 2018-10-05 RX ADMIN — OXYCODONE AND ACETAMINOPHEN 1 TABLET(S): 5; 325 TABLET ORAL at 00:21

## 2018-10-05 RX ADMIN — Medication 2: at 22:11

## 2018-10-05 RX ADMIN — Medication 100 MILLIGRAM(S): at 15:57

## 2018-10-05 RX ADMIN — Medication 2: at 11:42

## 2018-10-05 RX ADMIN — INSULIN GLARGINE 10 UNIT(S): 100 INJECTION, SOLUTION SUBCUTANEOUS at 22:10

## 2018-10-05 RX ADMIN — Medication 2: at 17:56

## 2018-10-05 RX ADMIN — SODIUM CHLORIDE 75 MILLILITER(S): 9 INJECTION INTRAMUSCULAR; INTRAVENOUS; SUBCUTANEOUS at 00:20

## 2018-10-05 RX ADMIN — MORPHINE SULFATE 2 MILLIGRAM(S): 50 CAPSULE, EXTENDED RELEASE ORAL at 10:26

## 2018-10-05 RX ADMIN — BENZOCAINE AND MENTHOL 1 LOZENGE: 5; 1 LIQUID ORAL at 23:20

## 2018-10-05 RX ADMIN — MORPHINE SULFATE 2 MILLIGRAM(S): 50 CAPSULE, EXTENDED RELEASE ORAL at 11:36

## 2018-10-05 RX ADMIN — Medication 4 UNIT(S): at 17:57

## 2018-10-05 RX ADMIN — GABAPENTIN 300 MILLIGRAM(S): 400 CAPSULE ORAL at 22:09

## 2018-10-05 RX ADMIN — MORPHINE SULFATE 2 MILLIGRAM(S): 50 CAPSULE, EXTENDED RELEASE ORAL at 11:50

## 2018-10-05 RX ADMIN — MORPHINE SULFATE 2 MILLIGRAM(S): 50 CAPSULE, EXTENDED RELEASE ORAL at 10:57

## 2018-10-05 RX ADMIN — OXYCODONE AND ACETAMINOPHEN 1 TABLET(S): 5; 325 TABLET ORAL at 23:32

## 2018-10-05 RX ADMIN — MORPHINE SULFATE 2 MILLIGRAM(S): 50 CAPSULE, EXTENDED RELEASE ORAL at 10:30

## 2018-10-05 RX ADMIN — GABAPENTIN 300 MILLIGRAM(S): 400 CAPSULE ORAL at 15:57

## 2018-10-05 RX ADMIN — Medication 4: at 06:41

## 2018-10-05 RX ADMIN — MORPHINE SULFATE 2 MILLIGRAM(S): 50 CAPSULE, EXTENDED RELEASE ORAL at 10:06

## 2018-10-05 RX ADMIN — OXYCODONE AND ACETAMINOPHEN 2 TABLET(S): 5; 325 TABLET ORAL at 16:25

## 2018-10-05 NOTE — DISCHARGE NOTE ADULT - PLAN OF CARE
return to prehospital level of function, improvement of activities of daily living 49 y/o female s/p C4-5 ACDF   - follow up with Dr. Goetz outpatient  - take pain medication as prescribed 49 y/o female s/p C4-5 ACDF   - follow up with Dr. Goetz outpatient. Call the office to schedule an appointment  - take pain medication as prescribed  - if you notice difficulty swallowing that is worsening, wound site redness/oozing, please return to the ER and call the Dr. Goetz's office immediately  - please follow-up with your PCP for strict blood glucose monitoring and strict blood pressure control. Please make an appointment once you are discharged

## 2018-10-05 NOTE — DISCHARGE NOTE ADULT - ADDITIONAL INSTRUCTIONS
After cervical (neck) surgery, you will have muscle pain for up to 6 weeks post-operatively. A sore throat often occurs after intubation for anesthesia. Throat discomfort and swallowing discomfort is normal after cervical fusions and should get better in 2-4 weeks. We recommend  liquids and/or soft foods.  Fatigue is common following surgery. Listen to your body and rest if you feel tired.  You should get up and walk around every hour during the daytime to keep your blood circulating.  No bending, lifting or twisting for the first 4-8 weeks, but walking is recommended. Do not lift anything heavy with your arms  Wear your collar as directed.  No driving until cleared by Dr. Goetz  Take medications as prescribed After cervical (neck) surgery, you will have muscle pain for up to 6 weeks post-operatively. A sore throat often occurs after intubation for anesthesia. Throat discomfort and swallowing discomfort is normal after cervical fusions and should get better in 2-4 weeks. We recommend  liquids and/or soft foods.  Fatigue is common following surgery. Listen to your body and rest if you feel tired.  You should get up and walk around every hour during the daytime to keep your blood circulating.  No bending, lifting or twisting for the first 4-8 weeks, but walking is recommended. Do not lift anything heavy with your arms  Wear your collar, if required, as directed.  No driving until cleared by Dr. Goetz  Take medications as prescribed

## 2018-10-05 NOTE — DISCHARGE NOTE ADULT - CARE PLAN
Principal Discharge DX:	Cervical cord compression with myelopathy  Goal:	return to prehospital level of function, improvement of activities of daily living  Assessment and plan of treatment:	47 y/o female s/p C4-5 ACDF   - follow up with Dr. Goetz outpatient  - take pain medication as prescribed Principal Discharge DX:	Cervical cord compression with myelopathy  Goal:	return to prehospital level of function, improvement of activities of daily living  Assessment and plan of treatment:	47 y/o female s/p C4-5 ACDF   - follow up with Dr. Goetz outpatient. Call the office to schedule an appointment  - take pain medication as prescribed  - if you notice difficulty swallowing that is worsening, wound site redness/oozing, please return to the ER and call the Dr. Goetz's office immediately  - please follow-up with your PCP for strict blood glucose monitoring and strict blood pressure control. Please make an appointment once you are discharged

## 2018-10-05 NOTE — DISCHARGE NOTE ADULT - MEDICATION SUMMARY - MEDICATIONS TO TAKE
I will START or STAY ON the medications listed below when I get home from the hospital:    oxyCODONE-acetaminophen 5 mg-325 mg oral tablet  -- Take 1-2 tab(s) by mouth every 4 hours, As Needed -Moderate Pain (4 - 6) MDD:12 tabs  -- Indication: For severe pain    diazePAM 5 mg oral tablet  -- 1 tab(s) by mouth every 8 hours, As needed, muscle spasm MDD:3 tabs  -- Indication: For muscle spasm I will START or STAY ON the medications listed below when I get home from the hospital:    oxyCODONE-acetaminophen 5 mg-325 mg oral tablet  -- Take 1-2 tab(s) by mouth every 4 hours, As Needed -Moderate Pain (4 - 6) MDD:12 tabs  -- Indication: For severe pain    gabapentin 300 mg oral capsule  -- 1 cap(s) by mouth every 8 hours  -- Indication: For neuropathic pain    diazePAM 5 mg oral tablet  -- 1 tab(s) by mouth every 8 hours, As needed, muscle spasm MDD:3 tabs  -- Indication: For muscle spasm

## 2018-10-05 NOTE — DISCHARGE NOTE ADULT - CARE PROVIDER_API CALL
Narciso Goetz), Neurological Surgery  [address]  [address]  [place] [address]  Phone: [phone]  Fax: [phone]

## 2018-10-05 NOTE — BRIEF OPERATIVE NOTE - PROCEDURE
<<-----Click on this checkbox to enter Procedure Anterior cervical discectomy and fusion (ACDF) at single level using bone graft  10/05/2018  C4-C5  Active  [id number]

## 2018-10-05 NOTE — DISCHARGE NOTE ADULT - HOSPITAL COURSE
HPI:  49 y/o female pmhx DM, initially presented to OSH with CC chest pain 1 week ago and was admitted for work up. Upon furhter work up and complaints of unstable walking, patient was diagnosed with cervical stenosis most prominent at C45 level and decision was made to transfer to [facility] for surgical intervention. At OSH patient reported "burning" and "cramping" of both arms and legs with a recent fall form a ladder approx 2 weeks ago. Patient was started on steroids, gabapentin at OSH with slight improvement. MRI and imaging performed at OSH revealed signal change concerning for cord injury a/w cervical spondylolisthesis. Patient denies any b/b dysfunction. Denies any CP, SOB or difficulty breathing. Denies any paralysis, physical therapy treatment, or epidural injections for relief    10/5: s/p C4-C5 ACDF  10/6:    Patient ambulating, voiding, tolerating PO diet, pain adequately controlled, ready for discharge. Patient understands and agrees to discharge plan. HPI:  49 y/o female pmhx DM, initially presented to OSH with CC chest pain 1 week ago and was admitted for work up. Upon furhter work up and complaints of unstable walking, patient was diagnosed with cervical stenosis most prominent at C45 level and decision was made to transfer to [facility] for surgical intervention. At OSH patient reported "burning" and "cramping" of both arms and legs with a recent fall form a ladder approx 2 weeks ago. Patient was started on steroids, gabapentin at OSH with slight improvement. MRI and imaging performed at OSH revealed signal change concerning for cord injury a/w cervical spondylolisthesis. Patient denies any b/b dysfunction. Denies any CP, SOB or difficulty breathing. Denies any paralysis, physical therapy treatment, or epidural injections for relief On 10/5 Pt underwent C4-C5 ACDF. Post-operative hospital course without complications.     Patient ambulating, voiding, tolerating PO diet, pain adequately controlled, ready for discharge. Patient understands and agrees to discharge plan.

## 2018-10-05 NOTE — DISCHARGE NOTE ADULT - PATIENT PORTAL LINK FT
You can access the [email or web address], offered by [facility], by registering with the following website: [email or web address]

## 2018-10-06 DIAGNOSIS — Z98.890 OTHER SPECIFIED POSTPROCEDURAL STATES: ICD-10-CM

## 2018-10-06 LAB
ANION GAP SERPL CALC-SCNC: 16 MMOL/L — SIGNIFICANT CHANGE UP (ref 5–17)
BUN SERPL-MCNC: 9 MG/DL — SIGNIFICANT CHANGE UP (ref 7–23)
CALCIUM SERPL-MCNC: 9.3 MG/DL — SIGNIFICANT CHANGE UP (ref 8.4–10.5)
CHLORIDE SERPL-SCNC: 97 MMOL/L — SIGNIFICANT CHANGE UP (ref 96–108)
CO2 SERPL-SCNC: 24 MMOL/L — SIGNIFICANT CHANGE UP (ref 22–31)
CREAT SERPL-MCNC: 0.63 MG/DL — SIGNIFICANT CHANGE UP (ref 0.5–1.3)
GLUCOSE BLDC GLUCOMTR-MCNC: 233 MG/DL — HIGH (ref 70–99)
GLUCOSE BLDC GLUCOMTR-MCNC: 333 MG/DL — HIGH (ref 70–99)
GLUCOSE SERPL-MCNC: 270 MG/DL — HIGH (ref 70–99)
HCT VFR BLD CALC: 38.3 % — SIGNIFICANT CHANGE UP (ref 34.5–45)
HGB BLD-MCNC: 12.3 G/DL — SIGNIFICANT CHANGE UP (ref 11.5–15.5)
MAGNESIUM SERPL-MCNC: 2.1 MG/DL — SIGNIFICANT CHANGE UP (ref 1.6–2.6)
MCHC RBC-ENTMCNC: 27.6 PG — SIGNIFICANT CHANGE UP (ref 27–34)
MCHC RBC-ENTMCNC: 32.1 G/DL — SIGNIFICANT CHANGE UP (ref 32–36)
MCV RBC AUTO: 85.9 FL — SIGNIFICANT CHANGE UP (ref 80–100)
PHOSPHATE SERPL-MCNC: 3 MG/DL — SIGNIFICANT CHANGE UP (ref 2.5–4.5)
PLATELET # BLD AUTO: 194 K/UL — SIGNIFICANT CHANGE UP (ref 150–400)
POTASSIUM SERPL-MCNC: 4.6 MMOL/L — SIGNIFICANT CHANGE UP (ref 3.5–5.3)
POTASSIUM SERPL-SCNC: 4.6 MMOL/L — SIGNIFICANT CHANGE UP (ref 3.5–5.3)
RBC # BLD: 4.46 M/UL — SIGNIFICANT CHANGE UP (ref 3.8–5.2)
RBC # FLD: 13.2 % — SIGNIFICANT CHANGE UP (ref 10.3–16.9)
SODIUM SERPL-SCNC: 137 MMOL/L — SIGNIFICANT CHANGE UP (ref 135–145)
WBC # BLD: 10.4 K/UL — SIGNIFICANT CHANGE UP (ref 3.8–10.5)
WBC # FLD AUTO: 10.4 K/UL — SIGNIFICANT CHANGE UP (ref 3.8–10.5)

## 2018-10-06 PROCEDURE — 99232 SBSQ HOSP IP/OBS MODERATE 35: CPT | Mod: GC

## 2018-10-06 RX ORDER — INSULIN LISPRO 100/ML
6 VIAL (ML) SUBCUTANEOUS
Qty: 0 | Refills: 0 | Status: DISCONTINUED | OUTPATIENT
Start: 2018-10-06 | End: 2018-10-06

## 2018-10-06 RX ORDER — INSULIN LISPRO 100/ML
8 VIAL (ML) SUBCUTANEOUS
Qty: 0 | Refills: 0 | Status: DISCONTINUED | OUTPATIENT
Start: 2018-10-06 | End: 2018-10-06

## 2018-10-06 RX ORDER — LOSARTAN POTASSIUM 100 MG/1
50 TABLET, FILM COATED ORAL DAILY
Qty: 0 | Refills: 0 | Status: DISCONTINUED | OUTPATIENT
Start: 2018-10-06 | End: 2018-10-07

## 2018-10-06 RX ORDER — INSULIN GLARGINE 100 [IU]/ML
15 INJECTION, SOLUTION SUBCUTANEOUS AT BEDTIME
Qty: 0 | Refills: 0 | Status: DISCONTINUED | OUTPATIENT
Start: 2018-10-06 | End: 2018-10-07

## 2018-10-06 RX ORDER — INSULIN LISPRO 100/ML
10 VIAL (ML) SUBCUTANEOUS
Qty: 0 | Refills: 0 | Status: DISCONTINUED | OUTPATIENT
Start: 2018-10-06 | End: 2018-10-07

## 2018-10-06 RX ORDER — METOPROLOL TARTRATE 50 MG
100 TABLET ORAL DAILY
Qty: 0 | Refills: 0 | Status: DISCONTINUED | OUTPATIENT
Start: 2018-10-06 | End: 2018-10-07

## 2018-10-06 RX ORDER — ATORVASTATIN CALCIUM 80 MG/1
20 TABLET, FILM COATED ORAL AT BEDTIME
Qty: 0 | Refills: 0 | Status: DISCONTINUED | OUTPATIENT
Start: 2018-10-06 | End: 2018-10-07

## 2018-10-06 RX ORDER — ATORVASTATIN CALCIUM 80 MG/1
80 TABLET, FILM COATED ORAL AT BEDTIME
Qty: 0 | Refills: 0 | Status: DISCONTINUED | OUTPATIENT
Start: 2018-10-06 | End: 2018-10-06

## 2018-10-06 RX ADMIN — Medication 8: at 13:04

## 2018-10-06 RX ADMIN — Medication 100 MILLIGRAM(S): at 09:49

## 2018-10-06 RX ADMIN — SODIUM CHLORIDE 75 MILLILITER(S): 9 INJECTION INTRAMUSCULAR; INTRAVENOUS; SUBCUTANEOUS at 14:02

## 2018-10-06 RX ADMIN — SODIUM CHLORIDE 75 MILLILITER(S): 9 INJECTION INTRAMUSCULAR; INTRAVENOUS; SUBCUTANEOUS at 00:59

## 2018-10-06 RX ADMIN — Medication 4: at 07:28

## 2018-10-06 RX ADMIN — BENZOCAINE AND MENTHOL 1 LOZENGE: 5; 1 LIQUID ORAL at 22:33

## 2018-10-06 RX ADMIN — Medication 10 UNIT(S): at 17:55

## 2018-10-06 RX ADMIN — INSULIN GLARGINE 15 UNIT(S): 100 INJECTION, SOLUTION SUBCUTANEOUS at 22:27

## 2018-10-06 RX ADMIN — OXYCODONE AND ACETAMINOPHEN 1 TABLET(S): 5; 325 TABLET ORAL at 00:30

## 2018-10-06 RX ADMIN — Medication 5 MILLIGRAM(S): at 10:59

## 2018-10-06 RX ADMIN — LOSARTAN POTASSIUM 50 MILLIGRAM(S): 100 TABLET, FILM COATED ORAL at 09:49

## 2018-10-06 RX ADMIN — Medication 100 MILLIGRAM(S): at 13:55

## 2018-10-06 RX ADMIN — OXYCODONE AND ACETAMINOPHEN 2 TABLET(S): 5; 325 TABLET ORAL at 06:11

## 2018-10-06 RX ADMIN — OXYCODONE AND ACETAMINOPHEN 2 TABLET(S): 5; 325 TABLET ORAL at 22:00

## 2018-10-06 RX ADMIN — Medication 6: at 22:24

## 2018-10-06 RX ADMIN — OXYCODONE AND ACETAMINOPHEN 2 TABLET(S): 5; 325 TABLET ORAL at 21:00

## 2018-10-06 RX ADMIN — Medication 2: at 18:31

## 2018-10-06 RX ADMIN — GABAPENTIN 300 MILLIGRAM(S): 400 CAPSULE ORAL at 22:27

## 2018-10-06 RX ADMIN — OXYCODONE AND ACETAMINOPHEN 2 TABLET(S): 5; 325 TABLET ORAL at 07:15

## 2018-10-06 RX ADMIN — Medication 10 MILLIGRAM(S): at 06:11

## 2018-10-06 RX ADMIN — Medication 4 UNIT(S): at 08:28

## 2018-10-06 RX ADMIN — GABAPENTIN 300 MILLIGRAM(S): 400 CAPSULE ORAL at 13:55

## 2018-10-06 RX ADMIN — Medication 6 UNIT(S): at 13:04

## 2018-10-06 RX ADMIN — GABAPENTIN 300 MILLIGRAM(S): 400 CAPSULE ORAL at 06:10

## 2018-10-06 RX ADMIN — OXYCODONE AND ACETAMINOPHEN 2 TABLET(S): 5; 325 TABLET ORAL at 13:54

## 2018-10-06 RX ADMIN — OXYCODONE AND ACETAMINOPHEN 2 TABLET(S): 5; 325 TABLET ORAL at 14:20

## 2018-10-06 NOTE — PROGRESS NOTE ADULT - ATTENDING COMMENTS
Patient seen and examined with house-staff during bedside rounds.  Resident note read, including vitals, physical findings, laboratory data, and radiological reports.   Revisions included below.  Direct personal management at bed side and extensive interpretation of the data.  Plan was outlined and discussed in details with the housestaff.  Decision making of high complexity  Action taken for acute disease activity to reflect the level of care provided:  - medication reconciliation  - review laboratory data  I discussed with the patient the echo findings and better control of her blood pressure as an outpatient. The patient will followup with her cardiologist
Patient seen and examined with house-staff during bedside rounds.  Resident note read, including vitals, physical findings, laboratory data, and radiological reports.   Revisions included below.  Direct personal management at bed side and extensive interpretation of the data.  Plan was outlined and discussed in details with the housestaff.  Decision making of high complexity  Action taken for acute disease activity to reflect the level of care provided:  - medication reconciliation  - review laboratory data  I discussed with the patient the echo findings and better control of her blood pressure as an outpatient. The patient will followup with her cardiologist
Patient seen and examined with house-staff during bedside rounds.  Resident note read, including vitals, physical findings, laboratory data, and radiological reports.   Revisions included below.  Direct personal management at bed side and extensive interpretation of the data.  Plan was outlined and discussed in details with the housestaff.  Decision making of high complexity  Action taken for acute disease activity to reflect the level of care provided:  - medication reconciliation  - review laboratory data

## 2018-10-07 VITALS — HEIGHT: 64 IN | WEIGHT: 190.04 LBS

## 2018-10-07 DIAGNOSIS — R13.10 DYSPHAGIA, UNSPECIFIED: ICD-10-CM

## 2018-10-07 DIAGNOSIS — G95.20 UNSPECIFIED CORD COMPRESSION: ICD-10-CM

## 2018-10-07 LAB
ANION GAP SERPL CALC-SCNC: 13 MMOL/L — SIGNIFICANT CHANGE UP (ref 5–17)
BUN SERPL-MCNC: 8 MG/DL — SIGNIFICANT CHANGE UP (ref 7–23)
CALCIUM SERPL-MCNC: 9.1 MG/DL — SIGNIFICANT CHANGE UP (ref 8.4–10.5)
CHLORIDE SERPL-SCNC: 101 MMOL/L — SIGNIFICANT CHANGE UP (ref 96–108)
CO2 SERPL-SCNC: 24 MMOL/L — SIGNIFICANT CHANGE UP (ref 22–31)
CREAT SERPL-MCNC: 0.64 MG/DL — SIGNIFICANT CHANGE UP (ref 0.5–1.3)
GLUCOSE BLDC GLUCOMTR-MCNC: 219 MG/DL — HIGH (ref 70–99)
GLUCOSE BLDC GLUCOMTR-MCNC: 241 MG/DL — HIGH (ref 70–99)
GLUCOSE SERPL-MCNC: 228 MG/DL — HIGH (ref 70–99)
HCT VFR BLD CALC: 36.9 % — SIGNIFICANT CHANGE UP (ref 34.5–45)
HGB BLD-MCNC: 11.6 G/DL — SIGNIFICANT CHANGE UP (ref 11.5–15.5)
MAGNESIUM SERPL-MCNC: 2.3 MG/DL — SIGNIFICANT CHANGE UP (ref 1.6–2.6)
MCHC RBC-ENTMCNC: 27.4 PG — SIGNIFICANT CHANGE UP (ref 27–34)
MCHC RBC-ENTMCNC: 31.4 G/DL — LOW (ref 32–36)
MCV RBC AUTO: 87 FL — SIGNIFICANT CHANGE UP (ref 80–100)
PHOSPHATE SERPL-MCNC: 3 MG/DL — SIGNIFICANT CHANGE UP (ref 2.5–4.5)
PLATELET # BLD AUTO: 178 K/UL — SIGNIFICANT CHANGE UP (ref 150–400)
POTASSIUM SERPL-MCNC: 4.1 MMOL/L — SIGNIFICANT CHANGE UP (ref 3.5–5.3)
POTASSIUM SERPL-SCNC: 4.1 MMOL/L — SIGNIFICANT CHANGE UP (ref 3.5–5.3)
RBC # BLD: 4.24 M/UL — SIGNIFICANT CHANGE UP (ref 3.8–5.2)
RBC # FLD: 13.4 % — SIGNIFICANT CHANGE UP (ref 10.3–16.9)
SODIUM SERPL-SCNC: 138 MMOL/L — SIGNIFICANT CHANGE UP (ref 135–145)
WBC # BLD: 10.7 K/UL — HIGH (ref 3.8–10.5)
WBC # FLD AUTO: 10.7 K/UL — HIGH (ref 3.8–10.5)

## 2018-10-07 PROCEDURE — 99223 1ST HOSP IP/OBS HIGH 75: CPT

## 2018-10-07 RX ORDER — DIAZEPAM 5 MG
1 TABLET ORAL
Qty: 21 | Refills: 0 | OUTPATIENT
Start: 2018-10-07 | End: 2018-10-13

## 2018-10-07 RX ORDER — GABAPENTIN 400 MG/1
1 CAPSULE ORAL
Qty: 42 | Refills: 0 | OUTPATIENT
Start: 2018-10-07 | End: 2018-10-20

## 2018-10-07 RX ADMIN — GABAPENTIN 300 MILLIGRAM(S): 400 CAPSULE ORAL at 11:40

## 2018-10-07 RX ADMIN — Medication 4: at 08:08

## 2018-10-07 RX ADMIN — OXYCODONE AND ACETAMINOPHEN 2 TABLET(S): 5; 325 TABLET ORAL at 05:18

## 2018-10-07 RX ADMIN — OXYCODONE AND ACETAMINOPHEN 2 TABLET(S): 5; 325 TABLET ORAL at 04:17

## 2018-10-07 RX ADMIN — BENZOCAINE AND MENTHOL 1 LOZENGE: 5; 1 LIQUID ORAL at 04:17

## 2018-10-07 RX ADMIN — OXYCODONE AND ACETAMINOPHEN 2 TABLET(S): 5; 325 TABLET ORAL at 12:51

## 2018-10-07 RX ADMIN — Medication 10 UNIT(S): at 12:46

## 2018-10-07 RX ADMIN — BENZOCAINE AND MENTHOL 1 LOZENGE: 5; 1 LIQUID ORAL at 07:48

## 2018-10-07 RX ADMIN — OXYCODONE AND ACETAMINOPHEN 2 TABLET(S): 5; 325 TABLET ORAL at 14:00

## 2018-10-07 RX ADMIN — Medication 4: at 12:46

## 2018-10-07 NOTE — PROGRESS NOTE ADULT - PROBLEM SELECTOR PLAN 1
the BS is better controlled but not at target.  She is off systemic steroids.  Continue current insulin regimen.  Give half the Lantus tonight to avoid hypoglycemia
the BS is better controlled but not at target.  She is off systemic steroids.  Continue current insulin regimen
s/p C4-C5 ACDF  continue pain management PRN
the BS is uncontrolled .  She is off systemic steroids.  Increase current insulin regimen.  Lantus and pre-meals were increased

## 2018-10-07 NOTE — PROGRESS NOTE ADULT - REASON FOR ADMISSION
s/p fall with neck pain

## 2018-10-07 NOTE — PROGRESS NOTE ADULT - PROBLEM SELECTOR PLAN 3
not on statin
not on statin
DRAGAN drain to be removed today, monitor for improvement of pain  pt able to swallow
not on statin

## 2018-10-07 NOTE — PROGRESS NOTE ADULT - PROBLEM SELECTOR PLAN 6
glucose stable  continue insulin sliding scale and FS monitoring glucose elevated  continue insulin sliding scale and FS monitoring

## 2018-10-07 NOTE — PROGRESS NOTE ADULT - PROBLEM SELECTOR PLAN 2
BP is stable but the ECHO was consistent with diastolic dysfunction
BP is stable but the ECHO was consistent with diastolic dysfunction
pt stable  continue GI/DVT ppx  pt ambulatory
BP is stable but the ECHO was consistent with diastolic dysfunction

## 2018-10-07 NOTE — PROGRESS NOTE ADULT - ASSESSMENT
48y old Female with PMH HTN, HLD, DM who presented with a chief complaint of neck pain 2/2 fall (07 Oct 2018 06:13), s/p C4-C5 ACDF doing well aside from mild dysphagia.

## 2018-10-07 NOTE — PROGRESS NOTE ADULT - PROBLEM SELECTOR PROBLEM 1
Type 2 diabetes mellitus without complication, unspecified whether long term insulin use
Type 2 diabetes mellitus without complication, unspecified whether long term insulin use
Cervical cord compression with myelopathy
Type 2 diabetes mellitus without complication, unspecified whether long term insulin use

## 2018-10-07 NOTE — PROGRESS NOTE ADULT - PROVIDER SPECIALTY LIST ADULT
Internal Medicine
Neurosurgery
Internal Medicine

## 2018-10-07 NOTE — PROGRESS NOTE ADULT - NS NEC GEN PE MLT EXAM PC
No bruits; no thyromegaly or nodules
No bruits; no thyromegaly or nodules
detailed exam
No bruits; no thyromegaly or nodules

## 2018-10-07 NOTE — PROGRESS NOTE ADULT - SUBJECTIVE AND OBJECTIVE BOX
HPI:  47 y/o female pmhx DM, initially presented to OSH with CC chest pain 1 week ago and was admitted for work up. Upon furhter work up and complaints of unstable walking, patient was diagnosed with cervical stenosis most prominent at C45 level and decision was made to transfer to [facility] for surgical intervention. At OSH patient reported "burning" and "cramping" of both arms and legs with a recent fall form a ladder approx 2 weeks ago. Patient was started on steroids, gabapentin at OSH with slight improvement. MRI and imaging performed at OSH revealed signal change concerning for cord injury a/w cervical spondylolisthesis. Patient denies any b/b dysfunction. Denies any CP, SOB or difficulty breathing. Denies any paralysis, physical therapy treatment, or epidural injections for relief. (01 Oct 2018 22:10)    OVERNIGHT EVENTS: No issues overnight, pain well controlled with persistent paresthesias in the hands. DRAGAN output - 15cc overnight.    10/5: POD#0 s/p C4-C5 ACDF   10/6: POD#1 pain improving, getting OOB with assistance, keep DRAGAN d/t high output    10/7: DRAGAN drain removed. Pain well controlled. OOB.     Vital Signs Last 24 Hrs  T(C): 37.3 (07 Oct 2018 04:08), Max: 37.4 (07 Oct 2018 00:02)  T(F): 99.1 (07 Oct 2018 04:08), Max: 99.4 (07 Oct 2018 00:02)  HR: 87 (07 Oct 2018 04:08) (86 - 116)  BP: 140/76 (07 Oct 2018 04:08) (114/55 - 155/73)  BP(mean): --  RR: 16 (07 Oct 2018 04:08) (16 - 18)  SpO2: 100% (07 Oct 2018 04:08) (96% - 100%)    I&O's Summary    05 Oct 2018 07:01  -  06 Oct 2018 07:00  --------------------------------------------------------  IN: 1425 mL / OUT: 1870 mL / NET: -445 mL    06 Oct 2018 07:01  -  07 Oct 2018 06:13  --------------------------------------------------------  IN: 800 mL / OUT: 1075 mL / NET: -275 mL        PHYSICAL EXAM:  Gen: NAD, AAOx3  HEENT: PERRL. EOMI.  Lungs: Clear b/l  Heart: S1, S2. NSR.  Abd: Full, soft, NT/ND, +BS  Exts: Pulses 2+ throughout  Neuro: CNs II-XII intact. Left UE 4+/5 , otherwise 5/5 throughout. Sensation to LT intact throughout. Hyprereflexic without clonus. Following commands.    TUBES/LINES:  [] Tang  [] Lumbar Drain  [x] Wound Drains  [] Others      DIET:  [] NPO  [x] Mechanical  [] Tube feeds    LABS:                        12.3   10.4  )-----------( 194      ( 06 Oct 2018 07:51 )             38.3     10-06    137  |  97  |  9   ----------------------------<  270<H>  4.6   |  24  |  0.63    Ca    9.3      06 Oct 2018 07:51  Phos  3.0     10-06  Mg     2.1     10-06              CAPILLARY BLOOD GLUCOSE      POCT Blood Glucose.: 333 mg/dL (06 Oct 2018 12:18)  POCT Blood Glucose.: 233 mg/dL (06 Oct 2018 07:10)      Drug Levels: [] N/A    CSF Analysis: [] N/A      Allergies    No Known Allergies    Intolerances      MEDICATIONS:  Antibiotics:    Neuro:  acetaminophen   Tablet .. 975 milliGRAM(s) Oral every 6 hours PRN  diazepam    Tablet 5 milliGRAM(s) Oral every 8 hours PRN  gabapentin 300 milliGRAM(s) Oral every 8 hours  ondansetron Injectable 4 milliGRAM(s) IV Push every 6 hours PRN  oxyCODONE    5 mG/acetaminophen 325 mG 1 Tablet(s) Oral every 4 hours PRN  oxyCODONE    5 mG/acetaminophen 325 mG 2 Tablet(s) Oral every 6 hours PRN    Anticoagulation:    OTHER:  atorvastatin 20 milliGRAM(s) Oral at bedtime  benzocaine 15 mG/menthol 3.6 mG Lozenge 1 Lozenge Oral every 2 hours PRN  bisacodyl 5 milliGRAM(s) Oral daily PRN  dextrose 40% Gel 15 Gram(s) Oral once PRN  dextrose 50% Injectable 12.5 Gram(s) IV Push once  docusate sodium 100 milliGRAM(s) Oral three times a day  famotidine    Tablet 20 milliGRAM(s) Oral once  glucagon  Injectable 1 milliGRAM(s) IntraMuscular once PRN  hydrALAZINE Injectable 10 milliGRAM(s) IV Push every 6 hours PRN  insulin glargine Injectable (LANTUS) 15 Unit(s) SubCutaneous at bedtime  insulin lispro (HumaLOG) corrective regimen sliding scale   SubCutaneous Before meals and at bedtime  insulin lispro Injectable (HumaLOG) 10 Unit(s) SubCutaneous three times a day before meals  losartan 50 milliGRAM(s) Oral daily  metoprolol succinate  milliGRAM(s) Oral daily  senna 2 Tablet(s) Oral at bedtime PRN    IVF:    CULTURES:    RADIOLOGY & ADDITIONAL TESTS:      ASSESSMENT: 48 year old female with DM II, HTN, HLD with C4-C5 HNP now s/p ACDF C4-C5 POD#2.    PLAN:  -DRAGAN removal today  -Continue hyperglycemia control w/ Lispro/Lantus/ISS, will resume home meds upon discharge,  -Dr. Cj darden for medical management,  -Pain meds PRN,  -OOB/PO as tolerated, PT eval  -Anticipate DC home,  -D/w Dr. Goetz
HPI:  47 y/o female pmhx DM, initially presented to OSH with CC chest pain 1 week ago and was admitted for work up. Upon furhter work up and complaints of unstable walking, patient was diagnosed with cervical stenosis most prominent at C45 level and decision was made to transfer to [facility] for surgical intervention. At OSH patient reported "burning" and "cramping" of both arms and legs with a recent fall form a ladder approx 2 weeks ago. Patient was started on steroids, gabapentin at OSH with slight improvement. MRI and imaging performed at OSH revealed signal change concerning for cord injury a/w cervical spondylolisthesis. Patient denies any b/b dysfunction. Denies any CP, SOB or difficulty breathing. Denies any paralysis, physical therapy treatment, or epidural injections for relief. (01 Oct 2018 22:10)    OVERNIGHT EVENTS:  Vital Signs Last 24 Hrs  T(C): 36.6 (02 Oct 2018 04:28), Max: 36.7 (01 Oct 2018 21:07)  T(F): 97.8 (02 Oct 2018 04:28), Max: 98.1 (01 Oct 2018 21:07)  HR: 81 (02 Oct 2018 04:28) (81 - 88)  BP: 111/73 (02 Oct 2018 04:28) (111/73 - 149/81)  BP(mean): --  RR: 16 (02 Oct 2018 04:28) (16 - 17)  SpO2: 100% (02 Oct 2018 04:28) (100% - 100%)    I&O's Summary    01 Oct 2018 07:01  -  02 Oct 2018 07:00  --------------------------------------------------------  IN: 0 mL / OUT: 300 mL / NET: -300 mL        PHYSICAL EXAM:  Neurological:      Physical Exam: A&O x 3, comfortable  EOMI, PERRL  neg drift  soft cervical collar in place  MONROE x 4, b/l UE 4/5 throughout, biceps, triceps, wrist flexion/extension 4/5, neg han's  b/l LE HF 4/5, KF/KE 5/5, DF 4/5, PF 5/5, FHL 5/5, Lt EHL 5/5, Rt EHL 3/5  sensation intact throughout  reflexes intact throughout  abd soft NTND  RRR CTA b/l	          Cardiovascular: RRR  Respiratory: Lungs CTAB  Gastrointestinal: +BS  Genitourinary: Voiding without difficulty  Extremities: warm and dry      DIET: Regular    LABS:                        14.2   8.0   )-----------( 221      ( 02 Oct 2018 06:40 )             42.7     10-02    133<L>  |  94<L>  |  11  ----------------------------<  269<H>  4.5   |  24  |  0.63    Ca    9.8      02 Oct 2018 06:40  Mg     2.0     10-02    TPro  6.7  /  Alb  4.2  /  TBili  0.7  /  DBili  x   /  AST  17  /  ALT  32  /  AlkPhos  101  10-02    PT/INR - ( 02 Oct 2018 06:40 )   PT: 11.7 sec;   INR: 1.05          PTT - ( 02 Oct 2018 06:40 )  PTT:29.6 sec        CAPILLARY BLOOD GLUCOSE      POCT Blood Glucose.: 298 mg/dL (02 Oct 2018 07:54)  POCT Blood Glucose.: 180 mg/dL (01 Oct 2018 22:01)      Drug Levels: [] N/A    CSF Analysis: [] N/A      Allergies    No Known Allergies    Intolerances      MEDICATIONS:  Antibiotics:    Neuro:  acetaminophen   Tablet .. 650 milliGRAM(s) Oral every 6 hours PRN  diazepam    Tablet 5 milliGRAM(s) Oral every 8 hours PRN  gabapentin 300 milliGRAM(s) Oral every 8 hours  ondansetron Injectable 4 milliGRAM(s) IV Push every 6 hours PRN  oxyCODONE    5 mG/acetaminophen 325 mG 1 Tablet(s) Oral every 4 hours PRN  oxyCODONE    5 mG/acetaminophen 325 mG 2 Tablet(s) Oral every 6 hours PRN    Anticoagulation:  enoxaparin Injectable 40 milliGRAM(s) SubCutaneous every 24 hours    OTHER:  bisacodyl 5 milliGRAM(s) Oral daily PRN  dexamethasone     Tablet 4 milliGRAM(s) Oral every 6 hours  dextrose 40% Gel 15 Gram(s) Oral once PRN  dextrose 50% Injectable 12.5 Gram(s) IV Push once  docusate sodium 100 milliGRAM(s) Oral three times a day  glucagon  Injectable 1 milliGRAM(s) IntraMuscular once PRN  insulin lispro (HumaLOG) corrective regimen sliding scale   SubCutaneous Before meals and at bedtime  pantoprazole    Tablet 40 milliGRAM(s) Oral daily  senna 2 Tablet(s) Oral at bedtime PRN    IVF:  dextrose 5%. 1000 milliLiter(s) IV Continuous <Continuous>  sodium chloride 0.9%. 1000 milliLiter(s) IV Continuous <Continuous>    CULTURES:    RADIOLOGY & ADDITIONAL TESTS:      ASSESSMENT:  48y Female Cervical HNP    PLAN:    NEURO:    Monitor neuro status  OT/PT  Pain Managment  Bowel regime  Medical Consult  Preop for OR in AM   Continue current medical regime    Dispo: Will discuss with attending
HPI:  47 y/o female pmhx DM, initially presented to OSH with CC chest pain 1 week ago and was admitted for work up. Upon furhter work up and complaints of unstable walking, patient was diagnosed with cervical stenosis most prominent at C45 level and decision was made to transfer to [facility] for surgical intervention. At OSH patient reported "burning" and "cramping" of both arms and legs with a recent fall form a ladder approx 2 weeks ago. Patient was started on steroids, gabapentin at OSH with slight improvement. MRI and imaging performed at OSH revealed signal change concerning for cord injury a/w cervical spondylolisthesis. Patient denies any b/b dysfunction. Denies any CP, SOB or difficulty breathing. Denies any paralysis, physical therapy treatment, or epidural injections for relief. (01 Oct 2018 22:10)    OVERNIGHT EVENTS: No acute events overnight. Patients weakness in her hands are improving    Vital Signs Last 24 Hrs  T(C): 36.8 (03 Oct 2018 08:44), Max: 37.1 (02 Oct 2018 17:33)  T(F): 98.2 (03 Oct 2018 08:44), Max: 98.8 (02 Oct 2018 17:33)  HR: 98 (03 Oct 2018 08:44) (98 - 118)  BP: 119/82 (03 Oct 2018 08:44) (119/82 - 161/90)  BP(mean): --  RR: 16 (03 Oct 2018 08:44) (16 - 18)  SpO2: 98% (03 Oct 2018 08:44) (96% - 99%)    I&O's Summary    02 Oct 2018 07:01  -  03 Oct 2018 07:00  --------------------------------------------------------  IN: 500 mL / OUT: 1550 mL / NET: -1050 mL        PHYSICAL EXAM:  Neurological: AA+Ox3, opens eyes spontaneously, following commands  CN II-XII grossly intact, PERRL, EOMI  Motor exam: MAEx4, 5/5 strength throughout  SILT in UE and LE b/l  Cardiovascular: regular rate and rhythm  Respiratory: clear to ausucltation  Gastrointestinal: soft, nontender, nondistended    TUBES/LINES:  [] Tang  [] Lumbar Drain  [] Wound Drains  [] Others      DIET:  [] NPO  [x] Mechanical  [] Tube feeds    LABS:                        14.2   12.9  )-----------( 234      ( 03 Oct 2018 10:21 )             42.9     10-02    133<L>  |  94<L>  |  11  ----------------------------<  269<H>  4.5   |  24  |  0.63    Ca    9.8      02 Oct 2018 06:40  Mg     2.0     10-02    TPro  6.7  /  Alb  4.2  /  TBili  0.7  /  DBili  x   /  AST  17  /  ALT  32  /  AlkPhos  101  10-02    PT/INR - ( 02 Oct 2018 06:40 )   PT: 11.7 sec;   INR: 1.05          PTT - ( 02 Oct 2018 06:40 )  PTT:29.6 sec        CAPILLARY BLOOD GLUCOSE      POCT Blood Glucose.: 308 mg/dL (03 Oct 2018 06:17)  POCT Blood Glucose.: 335 mg/dL (02 Oct 2018 22:55)  POCT Blood Glucose.: 340 mg/dL (02 Oct 2018 17:23)  POCT Blood Glucose.: 384 mg/dL (02 Oct 2018 11:40)  POCT Blood Glucose.: 385 mg/dL (02 Oct 2018 11:37)      Drug Levels: [] N/A    CSF Analysis: [] N/A      Allergies    No Known Allergies    Intolerances      MEDICATIONS:  Antibiotics:    Neuro:  acetaminophen   Tablet .. 650 milliGRAM(s) Oral every 6 hours PRN  diazepam    Tablet 5 milliGRAM(s) Oral every 8 hours PRN  gabapentin 300 milliGRAM(s) Oral every 8 hours  ondansetron Injectable 4 milliGRAM(s) IV Push every 6 hours PRN  oxyCODONE    5 mG/acetaminophen 325 mG 1 Tablet(s) Oral every 4 hours PRN  oxyCODONE    5 mG/acetaminophen 325 mG 2 Tablet(s) Oral every 6 hours PRN    Anticoagulation:  enoxaparin Injectable 40 milliGRAM(s) SubCutaneous every 24 hours    OTHER:  bisacodyl 5 milliGRAM(s) Oral daily PRN  dextrose 40% Gel 15 Gram(s) Oral once PRN  dextrose 50% Injectable 12.5 Gram(s) IV Push once  docusate sodium 100 milliGRAM(s) Oral three times a day  glucagon  Injectable 1 milliGRAM(s) IntraMuscular once PRN  insulin glargine Injectable (LANTUS) 10 Unit(s) SubCutaneous at bedtime  insulin lispro (HumaLOG) corrective regimen sliding scale   SubCutaneous Before meals and at bedtime  insulin lispro Injectable (HumaLOG) 4 Unit(s) SubCutaneous three times a day before meals  lisinopril 2.5 milliGRAM(s) Oral daily  pantoprazole    Tablet 40 milliGRAM(s) Oral daily  senna 2 Tablet(s) Oral at bedtime PRN    IVF:  dextrose 5%. 1000 milliLiter(s) IV Continuous <Continuous>    CULTURES:    RADIOLOGY & ADDITIONAL TESTS:      ASSESSMENT:  48y Female s/p fall now with cervical cord compression     PLAN:  - neuro checks  - vitals checks  - pain control  - plan for OR Friday for ACDF C4-5  - diabetic diet  - bowel regimen: colace, senna  - ISS  - DVT PROPHYLAXIS: [x] Venodynes [x] Heparin/Lovenox  - PT/OT/OOB    DISPOSITION: regional status    d/w Dr. Goetz
HPI:  47 y/o female pmhx DM, initially presented to OSH with CC chest pain 1 week ago and was admitted for work up. Upon furhter work up and complaints of unstable walking, patient was diagnosed with cervical stenosis most prominent at C45 level and decision was made to transfer to [facility] for surgical intervention. At OSH patient reported "burning" and "cramping" of both arms and legs with a recent fall form a ladder approx 2 weeks ago. Patient was started on steroids, gabapentin at OSH with slight improvement. MRI and imaging performed at OSH revealed signal change concerning for cord injury a/w cervical spondylolisthesis. Patient denies any b/b dysfunction. Denies any CP, SOB or difficulty breathing. Denies any paralysis, physical therapy treatment, or epidural injections for relief. (01 Oct 2018 22:10)    OVERNIGHT EVENTS:  Vital Signs Last 24 Hrs  T(C): 36.6 (05 Oct 2018 13:17), Max: 36.8 (04 Oct 2018 16:42)  T(F): 97.9 (05 Oct 2018 13:17), Max: 98.2 (04 Oct 2018 16:42)  HR: 85 (05 Oct 2018 13:17) (82 - 101)  BP: 140/67 (05 Oct 2018 13:17) (98/61 - 166/105)  BP(mean): 78 (05 Oct 2018 12:00) (77 - 123)  RR: 18 (05 Oct 2018 13:17) (7 - 18)  SpO2: 96% (05 Oct 2018 13:17) (94% - 100%)    I&O's Summary    04 Oct 2018 07:  -  05 Oct 2018 07:00  --------------------------------------------------------  IN: 1940 mL / OUT: 900 mL / NET: 1040 mL    05 Oct 2018 07:  -  05 Oct 2018 15:22  --------------------------------------------------------  IN: 150 mL / OUT: 420 mL / NET: -270 mL        PHYSICAL EXAM:  Neurological:  A&OX3 Cranial nerves intact  MONROE  Right neck dressing with DRAGAN serous draiange  c/o sore throat   No edema  airway patent    Cardiovascular:RRR  Respiratory: Lungs CTAB   Gastrointestinal: +BS  Tolerating liquid diet  Genitourinary: voiding  Extremities: warm and dry  Incision/Wound:        DIET: Liquid  [  LABS:                        13.2   10.6  )-----------( 201      ( 04 Oct 2018 05:55 )             39.9     10-04    140  |  101  |  22  ----------------------------<  148<H>  4.3   |  25  |  0.84    Ca    9.4      04 Oct 2018 05:55      PT/INR - ( 04 Oct 2018 12:13 )   PT: 11.9 sec;   INR: 1.07          PTT - ( 04 Oct 2018 12:13 )  PTT:30.4 sec  Urinalysis Basic - ( 03 Oct 2018 17:26 )    Color: Yellow / Appearance: Clear / S.025 / pH: x  Gluc: x / Ketone: Trace mg/dL  / Bili: Negative / Urobili: 0.2 E.U./dL   Blood: x / Protein: NEGATIVE mg/dL / Nitrite: NEGATIVE   Leuk Esterase: NEGATIVE / RBC: x / WBC x   Sq Epi: x / Non Sq Epi: x / Bacteria: x          CAPILLARY BLOOD GLUCOSE      POCT Blood Glucose.: 190 mg/dL (05 Oct 2018 11:15)  POCT Blood Glucose.: 196 mg/dL (05 Oct 2018 09:23)  POCT Blood Glucose.: 216 mg/dL (05 Oct 2018 06:37)  POCT Blood Glucose.: 182 mg/dL (04 Oct 2018 21:13)  POCT Blood Glucose.: 181 mg/dL (04 Oct 2018 17:01)      Drug Levels: [] N/A    CSF Analysis: [] N/A      Allergies    No Known Allergies    Intolerances      MEDICATIONS:  Antibiotics:    Neuro:  acetaminophen   Tablet .. 975 milliGRAM(s) Oral every 6 hours PRN  diazepam    Tablet 5 milliGRAM(s) Oral every 8 hours PRN  gabapentin 300 milliGRAM(s) Oral every 8 hours  morphine  - Injectable 2 milliGRAM(s) IV Push every 30 minutes PRN  ondansetron Injectable 4 milliGRAM(s) IV Push every 6 hours PRN  oxyCODONE    5 mG/acetaminophen 325 mG 1 Tablet(s) Oral every 4 hours PRN  oxyCODONE    5 mG/acetaminophen 325 mG 2 Tablet(s) Oral every 6 hours PRN    Anticoagulation:    OTHER:  bisacodyl 5 milliGRAM(s) Oral daily PRN  dextrose 40% Gel 15 Gram(s) Oral once PRN  dextrose 50% Injectable 12.5 Gram(s) IV Push once  docusate sodium 100 milliGRAM(s) Oral three times a day  famotidine    Tablet 20 milliGRAM(s) Oral once  glucagon  Injectable 1 milliGRAM(s) IntraMuscular once PRN  hydrALAZINE Injectable 10 milliGRAM(s) IV Push every 6 hours PRN  insulin glargine Injectable (LANTUS) 10 Unit(s) SubCutaneous at bedtime  insulin lispro (HumaLOG) corrective regimen sliding scale   SubCutaneous Before meals and at bedtime  insulin lispro Injectable (HumaLOG) 4 Unit(s) SubCutaneous three times a day before meals  insulin lispro Injectable (HumaLOG) 10 Unit(s) SubCutaneous once  lisinopril 2.5 milliGRAM(s) Oral daily  senna 2 Tablet(s) Oral at bedtime PRN    IVF:  sodium chloride 0.9%. 1000 milliLiter(s) IV Continuous <Continuous>      ASSESSMENT:  48y Female s/p ACDF C4-5 with DRAGAN    PLAN:  NEURO:    Monitor neuro status  Pain Managment  Monitor DRAGAN output  Cepacol lozengers  Pain Managment  Bowel regime  Continue current medical regime    Dispo: DIscussed with attending
HPI:  49 y/o female pmhx DM, initially presented to OSH with CC chest pain 1 week ago and was admitted for work up. Upon furhter work up and complaints of unstable walking, patient was diagnosed with cervical stenosis most prominent at C45 level and decision was made to transfer to [facility] for surgical intervention. At OSH patient reported "burning" and "cramping" of both arms and legs with a recent fall form a ladder approx 2 weeks ago. Patient was started on steroids, gabapentin at OSH with slight improvement. MRI and imaging performed at OSH revealed signal change concerning for cord injury a/w cervical spondylolisthesis. Patient denies any b/b dysfunction. Denies any CP, SOB or difficulty breathing. Denies any paralysis, physical therapy treatment, or epidural injections for relief. (01 Oct 2018 22:10)      Hospital course:   10/5: POD#0 s/p C4-C5 ACDF   10/6: POD#1 pain improving, getting OOB with assistance, keep DRAGAN d/t high output      Vital Signs Last 24 Hrs  T(C): 36.7 (06 Oct 2018 10:18), Max: 37.3 (05 Oct 2018 23:29)  T(F): 98 (06 Oct 2018 10:18), Max: 99.1 (05 Oct 2018 23:29)  HR: 116 (06 Oct 2018 10:18) (82 - 116)  BP: 139/67 (06 Oct 2018 10:18) (98/61 - 189/92)  BP(mean): 78 (05 Oct 2018 12:00) (77 - 78)  RR: 18 (06 Oct 2018 10:18) (15 - 18)  SpO2: 98% (06 Oct 2018 10:18) (95% - 99%)    I&O's Summary    05 Oct 2018 07:01  -  06 Oct 2018 07:00  --------------------------------------------------------  IN: 1425 mL / OUT: 1870 mL / NET: -445 mL    06 Oct 2018 07:01  -  06 Oct 2018 11:09  --------------------------------------------------------  IN: 75 mL / OUT: 0 mL / NET: 75 mL        PHYSICAL EXAM:  Neurological: AOx3, NAD, FC, speech coherent   CN II-XII: EOMI, PERRL, face symmetric, tongue midline   Motor: MAEx4 5/5 UE and LE B/L   SILT throughout  WWP throughout   No pronator drift   Incision site: dressing C/D/I, no erythema, edema or purulent drainage + DRAGAN drain serosang o/p  Cardiovascular: regular rate   Respiratory: unlabored breathing on RA   Gastrointestinal: abd soft, NT, ND   Genitourinary: no conner in place   Extremities: no LE edema     TUBES/LINES:  [] Conner  [] Lumbar Drain  [x] Wound Drains: DRAGAN x1, o/p 40/12 hours 50/24 hours   [] NGT   [] EVD   [] CVC  [] Other      DIET:  [] NPO  [x] Mechanical  [] Tube feeds    LABS:                        12.3   10.4  )-----------( 194      ( 06 Oct 2018 07:51 )             38.3     10-06    137  |  97  |  9   ----------------------------<  270<H>  4.6   |  24  |  0.63    Ca    9.3      06 Oct 2018 07:51  Phos  3.0     10-06  Mg     2.1     10-06      PT/INR - ( 04 Oct 2018 12:13 )   PT: 11.9 sec;   INR: 1.07          PTT - ( 04 Oct 2018 12:13 )  PTT:30.4 sec        CAPILLARY BLOOD GLUCOSE      POCT Blood Glucose.: 233 mg/dL (06 Oct 2018 07:10)  POCT Blood Glucose.: 197 mg/dL (05 Oct 2018 21:56)  POCT Blood Glucose.: 160 mg/dL (05 Oct 2018 17:35)  POCT Blood Glucose.: 190 mg/dL (05 Oct 2018 11:15)      Drug Levels: [] N/A    CSF Analysis: [] N/A      Allergies    No Known Allergies    Intolerances        Home Medications:      MEDICATIONS:  MEDICATIONS  (STANDING):  atorvastatin 80 milliGRAM(s) Oral at bedtime  dextrose 50% Injectable 12.5 Gram(s) IV Push once  docusate sodium 100 milliGRAM(s) Oral three times a day  famotidine    Tablet 20 milliGRAM(s) Oral once  gabapentin 300 milliGRAM(s) Oral every 8 hours  insulin glargine Injectable (LANTUS) 15 Unit(s) SubCutaneous at bedtime  insulin lispro (HumaLOG) corrective regimen sliding scale   SubCutaneous Before meals and at bedtime  insulin lispro Injectable (HumaLOG) 6 Unit(s) SubCutaneous three times a day before meals  losartan 50 milliGRAM(s) Oral daily  metoprolol succinate  milliGRAM(s) Oral daily  sodium chloride 0.9%. 1000 milliLiter(s) (75 mL/Hr) IV Continuous <Continuous>    MEDICATIONS  (PRN):  acetaminophen   Tablet .. 975 milliGRAM(s) Oral every 6 hours PRN Mild Pain (1 - 3)  benzocaine 15 mG/menthol 3.6 mG Lozenge 1 Lozenge Oral every 2 hours PRN Sore Throat  bisacodyl 5 milliGRAM(s) Oral daily PRN Constipation  dextrose 40% Gel 15 Gram(s) Oral once PRN Blood Glucose LESS THAN 70 milliGRAM(s)/deciliter  diazepam    Tablet 5 milliGRAM(s) Oral every 8 hours PRN muscle spasm  glucagon  Injectable 1 milliGRAM(s) IntraMuscular once PRN Glucose LESS THAN 70 milligrams/deciliter  hydrALAZINE Injectable 10 milliGRAM(s) IV Push every 6 hours PRN SBP > 160  morphine  - Injectable 2 milliGRAM(s) IV Push every 30 minutes PRN Severe Pain (7 - 10)  ondansetron Injectable 4 milliGRAM(s) IV Push every 6 hours PRN Nausea and/or Vomiting  oxyCODONE    5 mG/acetaminophen 325 mG 1 Tablet(s) Oral every 4 hours PRN Moderate Pain (4 - 6)  oxyCODONE    5 mG/acetaminophen 325 mG 2 Tablet(s) Oral every 6 hours PRN Severe Pain (7 - 10)  senna 2 Tablet(s) Oral at bedtime PRN Constipation      CULTURES:      RADIOLOGY & ADDITIONAL TESTS:      ASSESSMENT:  48y Female POD#1 s/p C4-C5 ACDF     PLAN:  NEURO:  - Cont. neuro checks   - OOB as tolerated with assistance   - PT/OT if pt not able to get OOB   - Pain control   - Keep DRAGAN drain, monitor output     CARDIOVASCULAR:  - BP goal: normotension   - Restart home BP meds: metoprolol, olmesartan     PULMONARY:  - IS     RENAL:  - I&Os   - Replete lytes prn   - Voiding     GI:  - Bowel regimen     HEME:  - Trend H/H     ID:  - Monitor for fevers   - Trend WBC    ENDO:  - ISS   - Lantus increased to 15 QHS from 10 and lispro increased to 6 TID from 4   - Appreciate Dr. Cj gonzalez re medical management     DVT PROPHYLAXIS:  [x] Venodynes                                [] Heparin/Lovenox    DISPOSITION:  - Floor status   - Full code   - Dispo: home pending removal of DRAGAN, likely tomorrow   - D/w Dr. Goetz
HPI:  49 y/o female pmhx DM, initially presented to OSH with CC chest pain 1 week ago and was admitted for work up. Upon furhter work up and complaints of unstable walking, patient was diagnosed with cervical stenosis most prominent at C45 level and decision was made to transfer to [facility] for surgical intervention. At OSH patient reported "burning" and "cramping" of both arms and legs with a recent fall form a ladder approx 2 weeks ago. Patient was started on steroids, gabapentin at OSH with slight improvement. MRI and imaging performed at OSH revealed signal change concerning for cord injury a/w cervical spondylolisthesis. Patient denies any b/b dysfunction. Denies any CP, SOB or difficulty breathing. Denies any paralysis, physical therapy treatment, or epidural injections for relief. (01 Oct 2018 22:10)    OVERNIGHT EVENTS:  Vital Signs Last 24 Hrs  T(C): 36.6 (04 Oct 2018 05:18), Max: 36.8 (03 Oct 2018 23:42)  T(F): 97.9 (04 Oct 2018 05:18), Max: 98.3 (03 Oct 2018 23:42)  HR: 85 (04 Oct 2018 05:18) (85 - 99)  BP: 111/73 (04 Oct 2018 05:18) (111/73 - 117/80)  BP(mean): --  RR: 16 (04 Oct 2018 05:18) (16 - 17)  SpO2: 100% (04 Oct 2018 05:18) (99% - 100%)    I&O's Summary    03 Oct 2018 07:01  -  04 Oct 2018 07:00  --------------------------------------------------------  IN: 1460 mL / OUT: 0 mL / NET: 1460 mL        PHYSICAL EXAM:  Neurological:      A&Ox 3 Cranial nerves intact  MONROE antigravity    Cardiovascular:RRR  Respiratory:Lungs CTAB  Gastrointestinal: +BS  Genitourinary: Voiding without difficulty  Extremities: warm and dry      DIET: Diabetic diet  LABS:                        13.2   10.6  )-----------( 201      ( 04 Oct 2018 05:55 )             39.9     10-04    140  |  101  |  22  ----------------------------<  148<H>  4.3   |  25  |  0.84    Ca    9.4      04 Oct 2018 05:55    TPro  7.1  /  Alb  4.2  /  TBili  0.8  /  DBili  x   /  AST  15  /  ALT  27  /  AlkPhos  92  10-03    PT/INR - ( 03 Oct 2018 12:52 )   PT: 13.3 sec;   INR: 1.19          PTT - ( 03 Oct 2018 12:52 )  PTT:42.0 sec  Urinalysis Basic - ( 03 Oct 2018 17:26 )    Color: Yellow / Appearance: Clear / S.025 / pH: x  Gluc: x / Ketone: Trace mg/dL  / Bili: Negative / Urobili: 0.2 E.U./dL   Blood: x / Protein: NEGATIVE mg/dL / Nitrite: NEGATIVE   Leuk Esterase: NEGATIVE / RBC: x / WBC x   Sq Epi: x / Non Sq Epi: x / Bacteria: x    CAPILLARY BLOOD GLUCOSE      POCT Blood Glucose.: 171 mg/dL (04 Oct 2018 07:35)  POCT Blood Glucose.: 206 mg/dL (03 Oct 2018 21:28)  POCT Blood Glucose.: 214 mg/dL (03 Oct 2018 17:14)  POCT Blood Glucose.: 222 mg/dL (03 Oct 2018 17:12)  POCT Blood Glucose.: 224 mg/dL (03 Oct 2018 11:57)      Drug Levels: [] N/A    CSF Analysis: [] N/A      Allergies    No Known Allergies    Intolerances      MEDICATIONS:  Antibiotics:    Neuro:  acetaminophen   Tablet .. 650 milliGRAM(s) Oral every 6 hours PRN  diazepam    Tablet 5 milliGRAM(s) Oral every 8 hours PRN  gabapentin 300 milliGRAM(s) Oral every 8 hours  ondansetron Injectable 4 milliGRAM(s) IV Push every 6 hours PRN  oxyCODONE    5 mG/acetaminophen 325 mG 1 Tablet(s) Oral every 4 hours PRN  oxyCODONE    5 mG/acetaminophen 325 mG 2 Tablet(s) Oral every 6 hours PRN    Anticoagulation:  enoxaparin Injectable 40 milliGRAM(s) SubCutaneous every 24 hours    OTHER:  bisacodyl 5 milliGRAM(s) Oral daily PRN  dextrose 40% Gel 15 Gram(s) Oral once PRN  dextrose 50% Injectable 12.5 Gram(s) IV Push once  docusate sodium 100 milliGRAM(s) Oral three times a day  glucagon  Injectable 1 milliGRAM(s) IntraMuscular once PRN  insulin glargine Injectable (LANTUS) 10 Unit(s) SubCutaneous at bedtime  insulin lispro (HumaLOG) corrective regimen sliding scale   SubCutaneous Before meals and at bedtime  insulin lispro Injectable (HumaLOG) 4 Unit(s) SubCutaneous three times a day before meals  lisinopril 2.5 milliGRAM(s) Oral daily  pantoprazole    Tablet 40 milliGRAM(s) Oral daily  senna 2 Tablet(s) Oral at bedtime PRN    IVF:  dextrose 5%. 1000 milliLiter(s) IV Continuous <Continuous>  sodium chloride 0.9%. 1000 milliLiter(s) IV Continuous <Continuous>    CULTURES:    RADIOLOGY & ADDITIONAL TESTS:      ASSESSMENT:  48y Female Cervical HNP    PLAN:    NEURO:    Monitor neuro status  Preop for OR in AM  Monitor blood glucose  Pain Managment  Bowel regime  NPO/IVF at [facility]  Continue current Medical regime    Dispo: Discussed with attending
Interval Events: Reviewed  Patient seen and examined at bedside.    Patient is a 48y old  Female who presents with a chief complaint of s/p fall with neck pain (06 Oct 2018 11:09)    she is doing well and the pain is better but the numbness in the hand is about the same  PAST MEDICAL & SURGICAL HISTORY:  Type 2 diabetes mellitus without complication, unspecified whether long term insulin use  H/O abdominal hysterectomy      MEDICATIONS:  Pulmonary:    Antimicrobials:    Anticoagulants:    Cardiac:  hydrALAZINE Injectable 10 milliGRAM(s) IV Push every 6 hours PRN  losartan 50 milliGRAM(s) Oral daily  metoprolol succinate  milliGRAM(s) Oral daily      Allergies    No Known Allergies    Intolerances        Vital Signs Last 24 Hrs  T(C): 36.7 (06 Oct 2018 10:18), Max: 37.3 (05 Oct 2018 23:29)  T(F): 98 (06 Oct 2018 10:18), Max: 99.1 (05 Oct 2018 23:29)  HR: 116 (06 Oct 2018 10:18) (90 - 116)  BP: 139/67 (06 Oct 2018 10:18) (139/67 - 189/92)  BP(mean): --  RR: 18 (06 Oct 2018 10:18) (16 - 18)  SpO2: 98% (06 Oct 2018 10:18) (95% - 99%)    10-05 @ 07:01  -  10-06 @ 07:00  --------------------------------------------------------  IN: 1425 mL / OUT: 1870 mL / NET: -445 mL    10-06 @ 07:01  -  10-06 @ 17:34  --------------------------------------------------------  IN: 800 mL / OUT: 565 mL / NET: 235 mL          LABS:      CBC Full  -  ( 06 Oct 2018 07:51 )  WBC Count : 10.4 K/uL  Hemoglobin : 12.3 g/dL  Hematocrit : 38.3 %  Platelet Count - Automated : 194 K/uL  Mean Cell Volume : 85.9 fL  Mean Cell Hemoglobin : 27.6 pg  Mean Cell Hemoglobin Concentration : 32.1 g/dL  Auto Neutrophil # : x  Auto Lymphocyte # : x  Auto Monocyte # : x  Auto Eosinophil # : x  Auto Basophil # : x  Auto Neutrophil % : x  Auto Lymphocyte % : x  Auto Monocyte % : x  Auto Eosinophil % : x  Auto Basophil % : x    10-06    137  |  97  |  9   ----------------------------<  270<H>  4.6   |  24  |  0.63    Ca    9.3      06 Oct 2018 07:51  Phos  3.0     10-06  Mg     2.1     10-06                          RADIOLOGY & ADDITIONAL STUDIES (The following images were personally reviewed):  Tang:                                     No  Urine output:                       adequate  DVT prophylaxis:                 Yes  Flattus:                                  Yes  Bowel movement:              No
Interval Events: Reviewed  Patient seen and examined at bedside.    Patient is a 48y old Female with PMH HTN, HLD, DM who presented with a chief complaint of neck pain 2/2 fall (07 Oct 2018 06:13), s/p C4-C5 ACDF.  states she has some pain with swallowing, not sure if it is from the DRAGAN drain or surgery. pt was able to eat breakfast today. pt states the numbness in her hands resolved, hands just feel crampy now. pt denies any pain at this time.      PAST MEDICAL & SURGICAL HISTORY:  Type 2 diabetes mellitus without complication, unspecified whether long term insulin use  H/O abdominal hysterectomy      MEDICATIONS:  Pulmonary:    Antimicrobials:    Anticoagulants:    Cardiac:  hydrALAZINE Injectable 10 milliGRAM(s) IV Push every 6 hours PRN  losartan 50 milliGRAM(s) Oral daily  metoprolol succinate  milliGRAM(s) Oral daily      Allergies    No Known Allergies    Intolerances        Vital Signs Last 24 Hrs  T(C): 37.3 (07 Oct 2018 09:31), Max: 37.4 (07 Oct 2018 00:02)  T(F): 99.2 (07 Oct 2018 09:31), Max: 99.4 (07 Oct 2018 00:02)  HR: 100 (07 Oct 2018 09:31) (86 - 116)  BP: 126/75 (07 Oct 2018 09:31) (114/55 - 155/73)  BP(mean): --  RR: 16 (07 Oct 2018 09:31) (16 - 18)  SpO2: 97% (07 Oct 2018 09:31) (96% - 100%)    10-06 @ 07:01  -  10-07 @ 07:00  --------------------------------------------------------  IN: 800 mL / OUT: 1075 mL / NET: -275 mL          LABS:      CBC Full  -  ( 07 Oct 2018 07:54 )  WBC Count : 10.7 K/uL  Hemoglobin : 11.6 g/dL  Hematocrit : 36.9 %  Platelet Count - Automated : 178 K/uL  Mean Cell Volume : 87.0 fL  Mean Cell Hemoglobin : 27.4 pg  Mean Cell Hemoglobin Concentration : 31.4 g/dL  Auto Neutrophil # : x  Auto Lymphocyte # : x  Auto Monocyte # : x  Auto Eosinophil # : x  Auto Basophil # : x  Auto Neutrophil % : x  Auto Lymphocyte % : x  Auto Monocyte % : x  Auto Eosinophil % : x  Auto Basophil % : x    10-07    138  |  101  |  8   ----------------------------<  228<H>  4.1   |  24  |  0.64    Ca    9.1      07 Oct 2018 07:54  Phos  3.0     10-07  Mg     2.3     10-07                          RADIOLOGY & ADDITIONAL STUDIES (The following images were personally reviewed):  Tang:                                     No  Urine output:                       adequate  DVT prophylaxis:                 Yes  Flattus:                                  Yes  Bowel movement:              No
Surgery: ACDF C4-5   Consent: Signed by patient vs HCP: in chart                    NAME/NUMBER of HCP:     No Known Allergies    OVERNIGHT EVENTS: RORO on    T(C): 36.4 (10-04-18 @ 20:30), Max: 36.8 (10-03-18 @ 23:42)  HR: 85 (10-04-18 @ 20:30) (84 - 101)  BP: 129/85 (10-04-18 @ 20:30) (90/61 - 129/85)  RR: 16 (10-04-18 @ 20:30) (15 - 18)  SpO2: 96% (10-04-18 @ 20:30) (96% - 100%)  Wt(kg): --    EXAM:       10-04    140  |  101  |  22  ----------------------------<  148<H>  4.3   |  25  |  0.84    Ca    9.4      04 Oct 2018 05:55    TPro  7.1  /  Alb  4.2  /  TBili  0.8  /  DBili  x   /  AST  15  /  ALT  27  /  AlkPhos  92  10-03    CBC Full  -  ( 04 Oct 2018 05:55 )  WBC Count : 10.6 K/uL  Hemoglobin : 13.2 g/dL  Hematocrit : 39.9 %  Platelet Count - Automated : 201 K/uL  Mean Cell Volume : 84.9 fL  Mean Cell Hemoglobin : 28.1 pg  Mean Cell Hemoglobin Concentration : 33.1 g/dL  Auto Neutrophil # : x  Auto Lymphocyte # : x  Auto Monocyte # : x  Auto Eosinophil # : x  Auto Basophil # : x  Auto Neutrophil % : x  Auto Lymphocyte % : x  Auto Monocyte % : x  Auto Eosinophil % : x  Auto Basophil % : x    PT/INR - ( 04 Oct 2018 12:13 )   PT: 11.9 sec;   INR: 1.07          PTT - ( 04 Oct 2018 12:13 )  PTT:30.4 sec    Pregnancy test: n/a    Type & Screen (in past 72hrs):  in chart     CXR: negative for infiltrates, effusions  EKG: Pending   ECHO: in chart    Medical Clearances: cleared medically by Dr. Corona   Other Clearances:  N/A     Last dose of antiplatelet/anticoagulation drug: SQL dc'd     Implanted Devices (pacemaker, drug pump...etc):  []YES   [x] NO                  If yes --> EPS consulted to interrogate/adjust device:    MRSA swab(exclusion - cerebral angiograms and microdiscectomy) :  Negative   Assessment:  49 yo s/p fall now with cervical stenosis C4-5     Plan:  - consent in chart   - medically cleared   - pending urine pregnancy test   - NPO/IVF.     - d/w Dr. Goetz
Interval Events: Reviewed  Patient seen and examined at bedside.    Patient is a 48y old  Female who presents with a chief complaint of s/p fall with neck pain (04 Oct 2018 09:48)  Is doing well today and went in for the surgery tomorrow    PAST MEDICAL & SURGICAL HISTORY:  Type 2 diabetes mellitus without complication, unspecified whether long term insulin use  H/O abdominal hysterectomy      MEDICATIONS:  Pulmonary:    Antimicrobials:    Anticoagulants:  enoxaparin Injectable 40 milliGRAM(s) SubCutaneous every 24 hours    Cardiac:  lisinopril 2.5 milliGRAM(s) Oral daily      Allergies    No Known Allergies    Intolerances        Vital Signs Last 24 Hrs  T(C): 36.8 (04 Oct 2018 16:42), Max: 36.8 (03 Oct 2018 23:42)  T(F): 98.2 (04 Oct 2018 16:42), Max: 98.3 (03 Oct 2018 23:42)  HR: 84 (04 Oct 2018 16:42) (84 - 101)  BP: 129/77 (04 Oct 2018 16:42) (90/61 - 129/77)  BP(mean): --  RR: 17 (04 Oct 2018 16:42) (15 - 18)  SpO2: 96% (04 Oct 2018 16:42) (96% - 100%)    10-03 @ 07:01  -  10-04 @ 07:00  --------------------------------------------------------  IN: 1460 mL / OUT: 0 mL / NET: 1460 mL    10-04 @ :01  -  10-04 @ 19:01  --------------------------------------------------------  IN: 1400 mL / OUT: 0 mL / NET: 1400 mL          LABS:      CBC Full  -  ( 04 Oct 2018 05:55 )  WBC Count : 10.6 K/uL  Hemoglobin : 13.2 g/dL  Hematocrit : 39.9 %  Platelet Count - Automated : 201 K/uL  Mean Cell Volume : 84.9 fL  Mean Cell Hemoglobin : 28.1 pg  Mean Cell Hemoglobin Concentration : 33.1 g/dL  Auto Neutrophil # : x  Auto Lymphocyte # : x  Auto Monocyte # : x  Auto Eosinophil # : x  Auto Basophil # : x  Auto Neutrophil % : x  Auto Lymphocyte % : x  Auto Monocyte % : x  Auto Eosinophil % : x  Auto Basophil % : x    10-04    140  |  101  |  22  ----------------------------<  148<H>  4.3   |  25  |  0.84    Ca    9.4      04 Oct 2018 05:55    TPro  7.1  /  Alb  4.2  /  TBili  0.8  /  DBili  x   /  AST  15  /  ALT  27  /  AlkPhos  92  10-03    PT/INR - ( 04 Oct 2018 12:13 )   PT: 11.9 sec;   INR: 1.07          PTT - ( 04 Oct 2018 12:13 )  PTT:30.4 sec      Urinalysis Basic - ( 03 Oct 2018 17:26 )    Color: Yellow / Appearance: Clear / S.025 / pH: x  Gluc: x / Ketone: Trace mg/dL  / Bili: Negative / Urobili: 0.2 E.U./dL   Blood: x / Protein: NEGATIVE mg/dL / Nitrite: NEGATIVE   Leuk Esterase: NEGATIVE / RBC: x / WBC x   Sq Epi: x / Non Sq Epi: x / Bacteria: x                  RADIOLOGY & ADDITIONAL STUDIES (The following images were personally reviewed):  Tang:                                     No  Urine output:                       adequate  DVT prophylaxis:                 Yes  Flattus:                                  Yes  Bowel movement:              No
Interval Events: Reviewed  Patient seen and examined at bedside.    Patient is a 48y old  Female who presents with a chief complaint of s/p fall with neck pain (03 Oct 2018 10:43)  she is doign better and she is ready for the surgery.    PAST MEDICAL & SURGICAL HISTORY:  Type 2 diabetes mellitus without complication, unspecified whether long term insulin use  H/O abdominal hysterectomy      MEDICATIONS:  Pulmonary:    Antimicrobials:    Anticoagulants:  enoxaparin Injectable 40 milliGRAM(s) SubCutaneous every 24 hours    Cardiac:  lisinopril 2.5 milliGRAM(s) Oral daily      Allergies    No Known Allergies    Intolerances        Vital Signs Last 24 Hrs  T(C): 36.6 (03 Oct 2018 17:42), Max: 36.8 (03 Oct 2018 08:44)  T(F): 97.9 (03 Oct 2018 17:42), Max: 98.2 (03 Oct 2018 08:44)  HR: 90 (03 Oct 2018 17:42) (90 - 105)  BP: 114/71 (03 Oct 2018 17:42) (114/71 - 138/87)  BP(mean): --  RR: 17 (03 Oct 2018 17:42) (16 - 17)  SpO2: 100% (03 Oct 2018 17:42) (98% - 100%)    10-02 @ :01  -  10-03 @ 07:00  --------------------------------------------------------  IN: 500 mL / OUT: 1550 mL / NET: -1050 mL    10-03 @ :01  -  10-03 @ 21:04  --------------------------------------------------------  IN: 760 mL / OUT: 0 mL / NET: 760 mL          LABS:      CBC Full  -  ( 03 Oct 2018 10:21 )  WBC Count : 12.9 K/uL  Hemoglobin : 14.2 g/dL  Hematocrit : 42.9 %  Platelet Count - Automated : 234 K/uL  Mean Cell Volume : 84.4 fL  Mean Cell Hemoglobin : 28.0 pg  Mean Cell Hemoglobin Concentration : 33.1 g/dL  Auto Neutrophil # : x  Auto Lymphocyte # : x  Auto Monocyte # : x  Auto Eosinophil # : x  Auto Basophil # : x  Auto Neutrophil % : x  Auto Lymphocyte % : x  Auto Monocyte % : x  Auto Eosinophil % : x  Auto Basophil % : x    1003    135  |  94<L>  |  17  ----------------------------<  260<H>  4.5   |  20<L>  |  0.71    Ca    10.2      03 Oct 2018 10:21  Mg     2.0     10    TPro  7.1  /  Alb  4.2  /  TBili  0.8  /  DBili  x   /  AST  15  /  ALT  27  /  AlkPhos  92  10    PT/INR - ( 03 Oct 2018 12:52 )   PT: 13.3 sec;   INR: 1.19          PTT - ( 03 Oct 2018 12:52 )  PTT:42.0 sec      Urinalysis Basic - ( 03 Oct 2018 17:26 )    Color: Yellow / Appearance: Clear / S.025 / pH: x  Gluc: x / Ketone: Trace mg/dL  / Bili: Negative / Urobili: 0.2 E.U./dL   Blood: x / Protein: NEGATIVE mg/dL / Nitrite: NEGATIVE   Leuk Esterase: NEGATIVE / RBC: x / WBC x   Sq Epi: x / Non Sq Epi: x / Bacteria: x        < from: Echocardiogram (10.03.18 @ 08:15) >  EXAM:  ECHOCARDIOGRAM (CARDIOL)                          PROCEDURE DATE:  10/03/2018          INTERPRETATION:  Patient Height: 162.6 cm  Patient Weight: 86.2 kg  Systolic Pressure: 129 mmHg  Diastolic Pressure: 79 mmHg  BSA: 1.9 m^2  InterpretationSummary  Normal left ventricular size and wall thickness.Probably normal left   ventricular wall motion.The left ventricular ejection fraction is   73%.The left   atrial size is normal.Right atrial size is normal.The right ventricle is   normal in size and function.Structurally normal aortic valve.No aortic   regurgitation noted.Structurally normal mitral valve.There is mild mitral   regurgitation.Structurally normal tricuspid valve.There is trace   tricuspid   regurgitation.There was insufficient TR detected from which to calculate   pulmonary artery systolic pressure.  Structurally normal pulmonic   valve.No   aortic root dilatation.Heterogenous space anterior to the heart; likely   prominent epicardial fat, cannot rule out small pericardial effusion.    < end of copied text >            RADIOLOGY & ADDITIONAL STUDIES (The following images were personally reviewed):  Tang:                                     No  Urine output:                       adequate  DVT prophylaxis:                 Yes  Flattus:                                  Yes  Bowel movement:              No

## 2018-10-08 LAB
GLUCOSE BLDC GLUCOMTR-MCNC: 165 MG/DL — HIGH (ref 70–99)
GLUCOSE BLDC GLUCOMTR-MCNC: 279 MG/DL — HIGH (ref 70–99)

## 2018-10-09 PROCEDURE — 80053 COMPREHEN METABOLIC PANEL: CPT

## 2018-10-09 PROCEDURE — 84702 CHORIONIC GONADOTROPIN TEST: CPT

## 2018-10-09 PROCEDURE — 84100 ASSAY OF PHOSPHORUS: CPT

## 2018-10-09 PROCEDURE — 71045 X-RAY EXAM CHEST 1 VIEW: CPT

## 2018-10-09 PROCEDURE — 80048 BASIC METABOLIC PNL TOTAL CA: CPT

## 2018-10-09 PROCEDURE — 85610 PROTHROMBIN TIME: CPT

## 2018-10-09 PROCEDURE — 70551 MRI BRAIN STEM W/O DYE: CPT

## 2018-10-09 PROCEDURE — 85027 COMPLETE CBC AUTOMATED: CPT

## 2018-10-09 PROCEDURE — 87641 MR-STAPH DNA AMP PROBE: CPT

## 2018-10-09 PROCEDURE — 86850 RBC ANTIBODY SCREEN: CPT

## 2018-10-09 PROCEDURE — 86900 BLOOD TYPING SEROLOGIC ABO: CPT

## 2018-10-09 PROCEDURE — 36415 COLL VENOUS BLD VENIPUNCTURE: CPT

## 2018-10-09 PROCEDURE — 86901 BLOOD TYPING SEROLOGIC RH(D): CPT

## 2018-10-09 PROCEDURE — 82962 GLUCOSE BLOOD TEST: CPT

## 2018-10-09 PROCEDURE — C1889: CPT

## 2018-10-09 PROCEDURE — 72141 MRI NECK SPINE W/O DYE: CPT

## 2018-10-09 PROCEDURE — 76000 FLUOROSCOPY <1 HR PHYS/QHP: CPT

## 2018-10-09 PROCEDURE — 83735 ASSAY OF MAGNESIUM: CPT

## 2018-10-09 PROCEDURE — C1713: CPT

## 2018-10-09 PROCEDURE — 83036 HEMOGLOBIN GLYCOSYLATED A1C: CPT

## 2018-10-09 PROCEDURE — 85025 COMPLETE CBC W/AUTO DIFF WBC: CPT

## 2018-10-09 PROCEDURE — 93306 TTE W/DOPPLER COMPLETE: CPT

## 2018-10-09 PROCEDURE — 85730 THROMBOPLASTIN TIME PARTIAL: CPT

## 2018-10-09 PROCEDURE — 81003 URINALYSIS AUTO W/O SCOPE: CPT

## 2018-10-11 DIAGNOSIS — E66.9 OBESITY, UNSPECIFIED: ICD-10-CM

## 2018-10-11 DIAGNOSIS — K21.9 GASTRO-ESOPHAGEAL REFLUX DISEASE WITHOUT ESOPHAGITIS: ICD-10-CM

## 2018-10-11 DIAGNOSIS — E78.5 HYPERLIPIDEMIA, UNSPECIFIED: ICD-10-CM

## 2018-10-11 DIAGNOSIS — I10 ESSENTIAL (PRIMARY) HYPERTENSION: ICD-10-CM

## 2018-10-11 DIAGNOSIS — M50.20 OTHER CERVICAL DISC DISPLACEMENT, UNSPECIFIED CERVICAL REGION: ICD-10-CM

## 2018-10-11 DIAGNOSIS — M47.12 OTHER SPONDYLOSIS WITH MYELOPATHY, CERVICAL REGION: ICD-10-CM

## 2018-10-11 DIAGNOSIS — Z79.4 LONG TERM (CURRENT) USE OF INSULIN: ICD-10-CM

## 2018-10-11 DIAGNOSIS — R13.10 DYSPHAGIA, UNSPECIFIED: ICD-10-CM

## 2018-10-11 DIAGNOSIS — E11.9 TYPE 2 DIABETES MELLITUS WITHOUT COMPLICATIONS: ICD-10-CM

## 2018-10-11 DIAGNOSIS — M50.021 CERVICAL DISC DISORDER AT C4-C5 LEVEL WITH MYELOPATHY: ICD-10-CM

## 2018-10-11 DIAGNOSIS — Z90.710 ACQUIRED ABSENCE OF BOTH CERVIX AND UTERUS: ICD-10-CM

## 2019-03-31 ENCOUNTER — HOSPITAL ENCOUNTER (OUTPATIENT)
Dept: HOSPITAL 31 - C.ER | Age: 49
Setting detail: OBSERVATION
LOS: 1 days | Discharge: HOME | End: 2019-04-01
Attending: INTERNAL MEDICINE | Admitting: INTERNAL MEDICINE
Payer: COMMERCIAL

## 2019-03-31 VITALS — RESPIRATION RATE: 20 BRPM

## 2019-03-31 VITALS — BODY MASS INDEX: 32.9 KG/M2

## 2019-03-31 DIAGNOSIS — G95.20: ICD-10-CM

## 2019-03-31 DIAGNOSIS — Z79.4: ICD-10-CM

## 2019-03-31 DIAGNOSIS — E66.9: ICD-10-CM

## 2019-03-31 DIAGNOSIS — Z85.43: ICD-10-CM

## 2019-03-31 DIAGNOSIS — E78.00: ICD-10-CM

## 2019-03-31 DIAGNOSIS — I10: ICD-10-CM

## 2019-03-31 DIAGNOSIS — E11.65: Primary | ICD-10-CM

## 2019-03-31 DIAGNOSIS — R42: ICD-10-CM

## 2019-03-31 DIAGNOSIS — R53.1: ICD-10-CM

## 2019-03-31 LAB
ALBUMIN SERPL-MCNC: 4.8 {NULL, G/DL} (ref 3.5–5)
ALBUMIN/GLOB SERPL: 1.5 {NULL, NULL} (ref 1–2.1)
ALT SERPL-CCNC: 20 {NULL, U/L} (ref 9–52)
AST SERPL-CCNC: 59 {NULL, U/L} (ref 14–36)
BASOPHILS # BLD AUTO: 0.1 {NULL, K/UL} (ref 0–0.2)
BASOPHILS NFR BLD: 0.7 {NULL, %} (ref 0–2)
BILIRUB UR-MCNC: NEGATIVE {NULL, NULL}
BNP SERPL-MCNC: 18.9 {NULL, PG/ML} (ref 0–450)
BUN SERPL-MCNC: 13 {NULL, MG/DL} (ref 7–17)
CALCIUM SERPL-MCNC: 10.1 {NULL, MG/DL} (ref 8.6–10.4)
EOSINOPHIL # BLD AUTO: 0.1 {NULL, K/UL} (ref 0–0.7)
EOSINOPHIL NFR BLD: 1 {NULL, %} (ref 0–4)
ERYTHROCYTE [DISTWIDTH] IN BLOOD BY AUTOMATED COUNT: 13.8 {NULL, %} (ref 11.5–14.5)
GFR NON-AFRICAN AMERICAN: > 60 {NULL, NULL}
GLUCOSE UR STRIP-MCNC: (no result) {NULL, MG/DL}
HGB BLD-MCNC: 14.2 {NULL, G/DL} (ref 11–16)
LEUKOCYTE ESTERASE UR-ACNC: (no result) {NULL, LEU/UL}
LYMPHOCYTES # BLD AUTO: 2.4 {NULL, K/UL} (ref 1–4.3)
LYMPHOCYTES NFR BLD AUTO: 26 {NULL, %} (ref 20–40)
MCH RBC QN AUTO: 27.8 {NULL, PG} (ref 27–31)
MCHC RBC AUTO-ENTMCNC: 33.3 {NULL, G/DL} (ref 33–37)
MCV RBC AUTO: 83.3 {NULL, FL} (ref 81–99)
MONOCYTES # BLD: 0.6 {NULL, K/UL} (ref 0–0.8)
MONOCYTES NFR BLD: 6 {NULL, %} (ref 0–10)
NEUTROPHILS # BLD: 6.2 {NULL, K/UL} (ref 1.8–7)
NEUTROPHILS NFR BLD AUTO: 66.3 {NULL, %} (ref 50–75)
NRBC BLD AUTO-RTO: 0 {NULL, %} (ref 0–2)
PH UR STRIP: 6 {NULL, NULL} (ref 5–8)
PLATELET # BLD: 232 {NULL, K/UL} (ref 130–400)
PMV BLD AUTO: 9.8 {NULL, FL} (ref 7.2–11.7)
PROT UR STRIP-MCNC: NEGATIVE {NULL, MG/DL}
RBC # BLD AUTO: 5.12 {NULL, MIL/UL} (ref 3.8–5.2)
RBC # UR STRIP: NEGATIVE {NULL, NULL}
SP GR UR STRIP: 1.03 {NULL, NULL} (ref 1–1.03)
SQUAMOUS EPITHIAL: 6 {NULL, /HPF} (ref 0–5)
UROBILINOGEN UR-MCNC: 2 {NULL, MG/DL} (ref 0.2–1)
WBC # BLD AUTO: 9.3 {NULL, K/UL} (ref 4.8–10.8)

## 2019-03-31 PROCEDURE — 84484 ASSAY OF TROPONIN QUANT: CPT

## 2019-03-31 PROCEDURE — 93005 ELECTROCARDIOGRAM TRACING: CPT

## 2019-03-31 PROCEDURE — 82948 REAGENT STRIP/BLOOD GLUCOSE: CPT

## 2019-03-31 PROCEDURE — 71045 X-RAY EXAM CHEST 1 VIEW: CPT

## 2019-03-31 PROCEDURE — 83036 HEMOGLOBIN GLYCOSYLATED A1C: CPT

## 2019-03-31 PROCEDURE — 85025 COMPLETE CBC W/AUTO DIFF WBC: CPT

## 2019-03-31 PROCEDURE — 99285 EMERGENCY DEPT VISIT HI MDM: CPT

## 2019-03-31 PROCEDURE — 81001 URINALYSIS AUTO W/SCOPE: CPT

## 2019-03-31 PROCEDURE — 70551 MRI BRAIN STEM W/O DYE: CPT

## 2019-03-31 PROCEDURE — 36415 COLL VENOUS BLD VENIPUNCTURE: CPT

## 2019-03-31 PROCEDURE — 80053 COMPREHEN METABOLIC PANEL: CPT

## 2019-03-31 PROCEDURE — 83880 ASSAY OF NATRIURETIC PEPTIDE: CPT

## 2019-03-31 RX ADMIN — METOPROLOL SUCCINATE SCH MG: 100 TABLET, EXTENDED RELEASE ORAL at 09:56

## 2019-03-31 RX ADMIN — INSULIN ASPART SCH U: 100 INJECTION, SOLUTION INTRAVENOUS; SUBCUTANEOUS at 08:35

## 2019-03-31 RX ADMIN — INSULIN ASPART SCH U: 100 INJECTION, SOLUTION INTRAVENOUS; SUBCUTANEOUS at 13:42

## 2019-03-31 RX ADMIN — INSULIN ASPART SCH: 100 INJECTION, SOLUTION INTRAVENOUS; SUBCUTANEOUS at 21:57

## 2019-03-31 RX ADMIN — INSULIN ASPART SCH: 100 INJECTION, SOLUTION INTRAVENOUS; SUBCUTANEOUS at 17:04

## 2019-03-31 RX ADMIN — ENOXAPARIN SODIUM SCH MG: 40 INJECTION SUBCUTANEOUS at 09:56

## 2019-03-31 NOTE — C.PDOC
History Of Present Illness





48 year old female presents with dizziness, headache, and blurry vision. Patient

claims she typically has well controlled blood pressure and diabetes, took her 

normal dose of meds today. 





Time Seen by Provider: 19 00:39


Chief Complaint (Nursing): Dizziness/Lightheaded


History Per: Patient


History/Exam Limitations: no limitations


Onset/Duration Of Symptoms: Hrs


Current Symptoms Are (Timing): Still Present


Recent travel outside of the [place]: No





Past Medical History


Reviewed: Historical Data, Nursing Documentation, Vital Signs


Vital Signs: 





                                Last Vital Signs











Temp  98.6 F   19 00:33


 


Pulse  116 H  19 01:28


 


Resp  15   19 01:28


 


BP  165/102 H  19 01:28


 


Pulse Ox  97   19 00:44














- Medical History


PMH: Anxiety, Cardia Arrhythmia (Paroxysmal SVT.), Diabetes, HTN, Hyperch

olesterolemia, Malignancy (Ovarian CA)


   Denies: Chronic Kidney Disease


Surgical History: Appendectomy


Family History: States: Unknown Family Hx





- Social History


Hx Tobacco Use: No


Hx Alcohol Use: No


Hx Substance Use: No





- Immunization History


Hx Tetanus Toxoid Vaccination: No


Hx Influenza Vaccination: Yes


Hx Pneumococcal Vaccination: No





Review Of Systems


Constitutional: Positive for: Other (Intentional weight loss due to change in 

diet, fingerstick usually 120-30 in the morning but typically check blood sugar 

after meals.).  Negative for: Fever, Chills


Eyes: Positive for: Vision Change (Blurry)


Cardiovascular: Negative for: Chest Pain, Palpitations


Respiratory: Negative for: Cough, Shortness of Breath


Gastrointestinal: Negative for: Nausea, Vomiting


Neurological: Positive for: Headache, Dizziness.  Negative for: Weakness, 

Numbness





Physical Exam





- Physical Exam


Appears: Non-toxic, No Acute Distress, Other (Obese black female)


Skin: Normal Color, Warm


Head: Atraumatic, Normacephalic


Eye(s): bilateral: Normal Inspection, PERRL, EOMI


Oral Mucosa: Moist


Neck: Normal, No Midline Cervical Tenderness, No Paracervical Tenderness, Supple


Chest: Symmetrical, No Tenderness


Cardiovascular: Rhythm Regular


Respiratory: Normal Breath Sounds, No Rales, No Rhonchi, No Wheezing


Gastrointestinal/Abdominal: Soft, No Tenderness


Neurological/Psych: Oriented x3, Normal Speech





ED Course And Treatment





- Laboratory Results


Result Diagrams: 


                                 19 01:04





                                 03/31/19 01:04


Lab Results: 





                                        











Troponin I  < 0.0120 ng/mL (0.00-0.120)   19  01:04    








                                        











NT-Pro-B Natriuret Pep  18.9 pg/mL (0-450)   19  01:04    








                                        











Total Bilirubin  1.1 mg/dL (0.2-1.3)   19  01:04    


 


AST  59 U/L (14-36)  H D 19  01:04    


 


ALT  20 U/L (9-52)   19  01:04    


 


Alkaline Phosphatase  178 U/L ()  H D 19  01:04    


 


Total Protein  7.9 g/dL (6.3-8.3)   19  01:04    


 


Albumin  4.8 g/dL (3.5-5.0)   19  01:04    


 


Globulin  3.2 gm/dL (2.2-3.9)   19  01:04    


 


Albumin/Globulin Ratio  1.5  (1.0-2.1)   19  01:04    











Lab Interpretation: Abnormal (glu 385H)


ECG: Interpreted By Me


ECG Rhythm: Sinus Tachycardia


ECG Interpretation: Abnormal


Rate From EC


O2 Sat by Pulse Oximetry: 97


Pulse Ox Interpretation: Normal





- Radiology


CXR: Interpreted by Me


CXR Interpretation: Yes: No Acute Disease


Progress Note: insulin/labetolol


Reevaluation Time: :19


Reassessment Condition: Improved





- Physician Consult Information


Outcome Of Conversation: [id number]: d/w Dr. AMELIA Peterson, PMD, ok to tele Obs





Medical Decision Making


Medical Decision Making: 





uncontrolled DM/HTN





atypical drug regimen


taking losartan @ 11A





Checking FS's AFTER meals





educated.





Disposition


Doctor Will See Patient In The: Hospital


Counseled Patient/Family Regarding: Studies Performed, Diagnosis





- Disposition


Disposition: HOSPITALIZED


Disposition Time: 02:21


Condition: GOOD





- Clinical Impression


Clinical Impression: 


 Diabetes, Uncontrolled hypertension








- Scribe Statement


The provider has reviewed the documentation as recorded by the Scribe





Eric Lara





All medical record entries made by the Scribe were at my direction and 

personally dictated by me. I have reviewed the chart and agree that the record 

accurately reflects my personal performance of the history, physical exam, 

medical decision making, and the department course for this patient. I have also

personally directed, reviewed, and agree with the discharge instructions and 

disposition.

## 2019-03-31 NOTE — CP.PCM.HP
History of Present Illness





- History of Present Illness


History of Present Illness: 





48 years old female came to te ED at [facility] complaining of a headache, 

dizziness and blurred vision for the past few days. The patient denies any 

nausea, any weakness, any fever, any cough, In the ED, her BP was 210/110 and he

r blood glucose was 360 mg%. She is known to have a hypertension, a non-insulin 

dependent diabetes mellitus, an obesity, a history of ovarian cancer, and 

intermittent supraventricular tachycardia, a recent cervical spine surgery for a

cervical cord compression, with weakness of the left arm and both legs. In the 

ED, her CXR was normal, an EKG was also normal. She was given PO Labetolol, and 

S/c regular insulin. Her BP and blood glucose normalized, but she is still has 

headache and dizziness.





Present on Admission





- Present on Admission


Any Indicators Present on Admission: No





Review of Systems





- EENT


Eyes: Blurred Vision





- Neurological


Neurological: Dizziness, Headaches





Past Patient History





- Infectious Disease


Hx of Infectious Diseases: None





- Tetanus Immunizations


Tetanus Immunization: Unknown





- Past Medical History & Family History


Past Medical History?: Yes





- Past Social History


Smoking Status: Never Smoked





- CARDIAC


Hx Cardia Arrhythmia: Yes (Paroxysmal SVT.)


Hx Hypercholesterolemia: Yes


Hx Hypertension: Yes





- PULMONARY


Hx Respiratory Disorders: No





- NEUROLOGICAL


Hx Neurological Disorder: No


Other/Comment: new onset of numbness in arms and legs. burning sensation





- HEENT


Hx HEENT Problems: No





- RENAL


Hx Chronic Kidney Disease: No





- ENDOCRINE/METABOLIC


Hx Endocrine Disorders: Yes


Hx Diabetes Insipidus: Yes


Hx Diabetes Mellitus Type 1: Yes


Hx Diabetes Mellitus Type 2: Yes





- HEMATOLOGICAL/ONCOLOGICAL


Hx Blood Disorders: No


Hx Cancer: Yes (Ovarian cancer)


Hx Chemotherapy: Yes





- INTEGUMENTARY


Hx Dermatological Problems: No





- MUSCULOSKELETAL/RHEUMATOLOGICAL


Hx Musculoskeletal Disorders: No


Hx Falls: Yes





- GASTROINTESTINAL


Hx Gastrointestinal Disorders: No





- GENITOURINARY/GYNECOLOGICAL


Hx Genitourinary Disorders: Yes (ovarian ca)


Hx Ovarian Cancer: Yes





- PSYCHIATRIC


Hx Anxiety: Yes


Hx Substance Use: No





- SURGICAL HISTORY


Hx Appendectomy: Yes





- ANESTHESIA


Hx Anesthesia: Yes


Hx Anesthesia Reactions: No





Meds


Allergies/Adverse Reactions: 


                                    Allergies











Allergy/AdvReac Type Severity Reaction Status Date / Time


 


No Known Allergies Allergy   Verified 03/31/19 00:29














Physical Exam





- Constitutional


Appears: Well, No Acute Distress





- Head Exam


Head Exam: NORMAL INSPECTION





- Eye Exam


Eye Exam: Normal appearance





- ENT Exam


ENT Exam: Normal Exam





- Neck Exam


Neck exam: Positive for: Normal Inspection





- Respiratory Exam


Respiratory Exam: Clear to Auscultation Bilateral, NORMAL BREATHING PATTERN


Additional comments: 





Tender left costochondral joints.





- Cardiovascular Exam


Cardiovascular Exam: REGULAR RHYTHM





- GI/Abdominal Exam


GI & Abdominal Exam: Normal Bowel Sounds, Soft





- Rectal Exam


Rectal Exam: Deferred





- Extremities Exam


Extremities exam: Positive for: normal inspection





- Back Exam


Back exam: NORMAL INSPECTION





- Neurological Exam


Neurological exam: Alert, Normal Gait, Oriented x3





- Psychiatric Exam


Psychiatric exam: Anxious





- Skin


Skin Exam: Dry, Intact, Normal Color, Warm





Results





- Vital Signs


Recent Vital Signs: 





                                Last Vital Signs











Temp  97.7 F   03/31/19 15:00


 


Pulse  89   03/31/19 16:10


 


Resp  20   03/31/19 15:00


 


BP  121/84   03/31/19 15:00


 


Pulse Ox  96   03/31/19 20:00














- Labs


Result Diagrams: 


                                 03/31/19 01:04





                                 03/31/19 01:04


Labs: 





                         Laboratory Results - last 24 hr











  03/31/19 03/31/19 03/31/19





  01:04 01:04 02:50


 


WBC  9.3  


 


RBC  5.12  


 


Hgb  14.2  


 


Hct  42.7  


 


MCV  83.3  


 


MCH  27.8  


 


MCHC  33.3  


 


RDW  13.8  


 


Plt Count  232  


 


MPV  9.8  


 


Neut % (Auto)  66.3  


 


Lymph % (Auto)  26.0  


 


Mono % (Auto)  6.0  


 


Eos % (Auto)  1.0  


 


Baso % (Auto)  0.7  


 


Neut # (Auto)  6.2  


 


Lymph # (Auto)  2.4  


 


Mono # (Auto)  0.6  


 


Eos # (Auto)  0.1  


 


Baso # (Auto)  0.1  


 


Sodium   136 


 


Potassium   4.6 


 


Chloride   101 


 


Carbon Dioxide   25 


 


Anion Gap   16 


 


BUN   13 


 


Creatinine   0.9 


 


Est GFR ( Amer)   > 60 


 


Est GFR (Non-Af Amer)   > 60 


 


POC Glucose (mg/dL)   


 


Random Glucose   385 H D 


 


Calcium   10.1 


 


Total Bilirubin   1.1 


 


AST   59 H D 


 


ALT   20 


 


Alkaline Phosphatase   178 H D 


 


Troponin I   < 0.0120 


 


NT-Pro-B Natriuret Pep   18.9 


 


Total Protein   7.9 


 


Albumin   4.8 


 


Globulin   3.2 


 


Albumin/Globulin Ratio   1.5 


 


Urine Color    Yellow


 


Urine Clarity    Clear


 


Urine pH    6.0


 


Ur Specific Gravity    1.026


 


Urine Protein    Negative


 


Urine Glucose (UA)    3+ H


 


Urine Ketones    Trace


 


Urine Blood    Negative


 


Urine Nitrate    Negative


 


Urine Bilirubin    Negative


 


Urine Urobilinogen    2.0 H


 


Ur Leukocyte Esterase    Neg


 


Urine WBC (Auto)    3


 


Urine RBC (Auto)    2


 


Ur Squamous Epith Cells    6 H














  03/31/19 03/31/19 03/31/19





  02:53 06:21 12:34


 


WBC   


 


RBC   


 


Hgb   


 


Hct   


 


MCV   


 


MCH   


 


MCHC   


 


RDW   


 


Plt Count   


 


MPV   


 


Neut % (Auto)   


 


Lymph % (Auto)   


 


Mono % (Auto)   


 


Eos % (Auto)   


 


Baso % (Auto)   


 


Neut # (Auto)   


 


Lymph # (Auto)   


 


Mono # (Auto)   


 


Eos # (Auto)   


 


Baso # (Auto)   


 


Sodium   


 


Potassium   


 


Chloride   


 


Carbon Dioxide   


 


Anion Gap   


 


BUN   


 


Creatinine   


 


Est GFR ( Amer)   


 


Est GFR (Non-Af Amer)   


 


POC Glucose (mg/dL)  300 H  188 H  169 H


 


Random Glucose   


 


Calcium   


 


Total Bilirubin   


 


AST   


 


ALT   


 


Alkaline Phosphatase   


 


Troponin I   


 


NT-Pro-B Natriuret Pep   


 


Total Protein   


 


Albumin   


 


Globulin   


 


Albumin/Globulin Ratio   


 


Urine Color   


 


Urine Clarity   


 


Urine pH   


 


Ur Specific Gravity   


 


Urine Protein   


 


Urine Glucose (UA)   


 


Urine Ketones   


 


Urine Blood   


 


Urine Nitrate   


 


Urine Bilirubin   


 


Urine Urobilinogen   


 


Ur Leukocyte Esterase   


 


Urine WBC (Auto)   


 


Urine RBC (Auto)   


 


Ur Squamous Epith Cells   














  03/31/19





  16:54


 


WBC 


 


RBC 


 


Hgb 


 


Hct 


 


MCV 


 


MCH 


 


MCHC 


 


RDW 


 


Plt Count 


 


MPV 


 


Neut % (Auto) 


 


Lymph % (Auto) 


 


Mono % (Auto) 


 


Eos % (Auto) 


 


Baso % (Auto) 


 


Neut # (Auto) 


 


Lymph # (Auto) 


 


Mono # (Auto) 


 


Eos # (Auto) 


 


Baso # (Auto) 


 


Sodium 


 


Potassium 


 


Chloride 


 


Carbon Dioxide 


 


Anion Gap 


 


BUN 


 


Creatinine 


 


Est GFR ( Amer) 


 


Est GFR (Non-Af Amer) 


 


POC Glucose (mg/dL)  131 H


 


Random Glucose 


 


Calcium 


 


Total Bilirubin 


 


AST 


 


ALT 


 


Alkaline Phosphatase 


 


Troponin I 


 


NT-Pro-B Natriuret Pep 


 


Total Protein 


 


Albumin 


 


Globulin 


 


Albumin/Globulin Ratio 


 


Urine Color 


 


Urine Clarity 


 


Urine pH 


 


Ur Specific Gravity 


 


Urine Protein 


 


Urine Glucose (UA) 


 


Urine Ketones 


 


Urine Blood 


 


Urine Nitrate 


 


Urine Bilirubin 


 


Urine Urobilinogen 


 


Ur Leukocyte Esterase 


 


Urine WBC (Auto) 


 


Urine RBC (Auto) 


 


Ur Squamous Epith Cells 














Assessment & Plan


(1) Uncontrolled hypertension


Assessment and Plan: 


To continue same home BP meds. Low salt diet.


Status: Acute   





(2) Uncontrolled diabetes mellitus


Assessment and Plan: 


To renew all meds and control blood glucose with Novolog according to Accucheck.


Status: Acute   





(3) Headache


Assessment and Plan: 


Will do MRI of the head, in a patient with a history of ovarian cancer.


Status: Acute   





Decision To Admit





- Pt Status Changed To:


Hospital Disposition Of: Observation





- .


Bed Request Type: Telemetry


Admitting Physician: Jose Antonio Peterson

## 2019-03-31 NOTE — RAD
Date of service: 



03/31/2019



PROCEDURE:  CHEST RADIOGRAPH, 1 VIEW



HISTORY:

SOB



COMPARISON:

Comparison is made with 09/20/2018



FINDINGS:



LUNGS:

Clear.



PLEURA:

No pneumothorax or pleural fluid seen.



CARDIOVASCULAR:

 No aortic atherosclerotic calcification present. Normal.



OSSEOUS STRUCTURES:

No significant abnormalities.



VISUALIZED UPPER ABDOMEN:

Normal.



OTHER FINDINGS:

None. 



IMPRESSION:

No active disease.

## 2019-04-01 VITALS — HEART RATE: 91 BPM | SYSTOLIC BLOOD PRESSURE: 125 MMHG | DIASTOLIC BLOOD PRESSURE: 81 MMHG | TEMPERATURE: 98.1 F

## 2019-04-01 VITALS — OXYGEN SATURATION: 98 %

## 2019-04-01 RX ADMIN — INSULIN ASPART SCH U: 100 INJECTION, SOLUTION INTRAVENOUS; SUBCUTANEOUS at 07:47

## 2019-04-01 RX ADMIN — INSULIN ASPART SCH U: 100 INJECTION, SOLUTION INTRAVENOUS; SUBCUTANEOUS at 12:17

## 2019-04-01 RX ADMIN — METOPROLOL SUCCINATE SCH MG: 100 TABLET, EXTENDED RELEASE ORAL at 10:27

## 2019-04-01 RX ADMIN — INSULIN ASPART SCH: 100 INJECTION, SOLUTION INTRAVENOUS; SUBCUTANEOUS at 17:05

## 2019-04-01 RX ADMIN — ENOXAPARIN SODIUM SCH MG: 40 INJECTION SUBCUTANEOUS at 10:28

## 2019-04-01 NOTE — CP.PCM.PN
Subjective





- Date & Time of Evaluation


Date of Evaluation: 04/01/19


Time of Evaluation: 18:19





- Subjective


Subjective: 





Patient has no complaint. Vital signs stable. Blood glucose better controlled. 

Head MRI essentially normal. Will discharge home today, on same home 

medications. To F/U with her PMD in one week.





Objective





- Vital Signs/Intake and Output


Vital Signs (last 24 hours): 


                                        











Temp Pulse Resp BP Pulse Ox


 


 98.1 F   91 H  20   125/81   98 


 


 04/01/19 15:00  04/01/19 15:00  04/01/19 15:00  04/01/19 15:00  04/01/19 15:00








Intake and Output: 


                                        











 04/01/19 04/01/19





 06:59 18:59


 


Intake Total  300


 


Balance  300














- Medications


Medications: 


                               Current Medications





Acetaminophen (Tylenol 325mg Tab)  650 mg PO Q6 PRN


   PRN Reason: Headache


   Last Admin: 03/31/19 17:51 Dose:  650 mg


Enoxaparin Sodium (Lovenox)  40 mg SC DAILY [facility]


   Last Admin: 04/01/19 10:28 Dose:  40 mg


Insulin Aspart (Novolog)  0 unit SC [facility]; Protocol


   Last Admin: 04/01/19 17:05 Dose:  Not Given


Losartan Potassium (Cozaar)  50 mg PO DAILY [facility]


   Last Admin: 04/01/19 10:27 Dose:  50 mg


Meclizine HCl (Antivert)  25 mg PO TID PRN


   PRN Reason: Dizziness


   Last Admin: 03/31/19 17:51 Dose:  25 mg


Metoprolol Succinate (Toprol Xl)  100 mg PO DAILY [facility]


   Last Admin: 04/01/19 10:27 Dose:  100 mg


Rosuvastatin Calcium (Crestor)  20 mg PO HS [facility]


   Last Admin: 03/31/19 21:57 Dose:  Not Given


Sitagliptin Phosphate (Januvia)  100 mg PO DAILY [facility]


   Last Admin: 04/01/19 10:27 Dose:  100 mg











- Labs


Labs: 


                                        





                                 03/31/19 01:04 





                                 03/31/19 01:04 











- Constitutional


Appears: Well, No Acute Distress





- Head Exam


Head Exam: NORMAL INSPECTION





- Eye Exam


Eye Exam: Normal appearance


Pupil Exam: NORMAL ACCOMODATION





- ENT Exam


ENT Exam: Normal Exam





- Neck Exam


Neck Exam: Normal Inspection





- Respiratory Exam


Respiratory Exam: Clear to Ausculation Bilateral, NORMAL BREATHING PATTERN





- Cardiovascular Exam


Cardiovascular Exam: REGULAR RHYTHM





- GI/Abdominal Exam


GI & Abdominal Exam: Soft, Normal Bowel Sounds





- Rectal Exam


Rectal Exam: Deferred





- Extremities Exam


Extremities Exam: Normal Inspection





- Back Exam


Back Exam: NORMAL INSPECTION





- Neurological Exam


Neurological Exam: Alert, Awake, Normal Gait, Oriented x3





- Psychiatric Exam


Psychiatric exam: Anxious





- Skin


Skin Exam: Dry, Intact, Normal Color, Warm





Assessment and Plan


(1) Uncontrolled hypertension


Status: Resolved   





(2) Uncontrolled diabetes mellitus


Status: Resolved   





(3) Headache


Status: Resolved

## 2019-04-01 NOTE — MRI
Date of service: 



04/01/2019



PROCEDURE:  MRI BRAIN WITHOUT CONTRAST



HISTORY:

Headache and dizziness.



COMPARISON:

CT head without contrast from 02/12/2018. 



TECHNIQUE:

Multiplanar, multisequence MR images of the brain were obtained 

without intravenous contrast enhancement.



FINDINGS:



HEMORRHAGE:

None



DWI:

No evidence of an acute or early subacute infarction.



BRAIN PARENCHYMA:

There are few tiny T2/FLAIR hyperintense foci in right frontal 

subcortical white matter.  There is no mass, mass effect or abnormal 

extra-axial fluid collection.  There is no territorial infarction. 



VENTRICLES:

The ventricles are normal in size, shape and configuration.



CRANIUM:

There is normal bone marrow signal pattern.



ORBITS:

Grossly unremarkable.



PARANASAL SINUSES/MASTOIDS:

There is mild mucosal thickening in the frontal sinuses and ethmoid 

air cells.  There is a small retention cyst/polyp in the right 

maxillary sinus.



VASCULAR SYSTEM:

There are normal signal voids in the larger intracranial arteries. 



OTHER FINDINGS:

None. 



IMPRESSION:

1.  No acute intracranial abnormality.



2.  Minimal right frontal subcortical white matter changes are 

strictly nonspecific and could represent gliosis, migraine headache 

effect or early chronic microangiopathic changes amongst others.

## 2019-04-02 NOTE — CARD
--------------- APPROVED REPORT --------------





Date of service: 03/31/2019



EKG Measurement

Heart [id number]

NC [id number]

[id number]

[id number]

[id number]



<Conclusion>

Sinus tachycardia

Otherwise normal ECG

## 2019-04-30 NOTE — CONSULT NOTE ADULT - LYMPH NODES
Patient states that she is not out of her Brovana, she just doesn't have anymore refills left on her medication. Pharmacy verified.     Thank you    No lymphadedenopathy

## 2023-08-17 ENCOUNTER — NEW REFERRAL (OUTPATIENT)
Dept: URBAN - METROPOLITAN AREA CLINIC 73 | Facility: CLINIC | Age: 53
End: 2023-08-17

## 2023-08-17 DIAGNOSIS — E11.3393: ICD-10-CM

## 2023-08-17 DIAGNOSIS — H40.013: ICD-10-CM

## 2023-08-17 PROCEDURE — 99204 OFFICE O/P NEW MOD 45 MIN: CPT

## 2023-08-17 PROCEDURE — 92202 OPSCPY EXTND ON/MAC DRAW: CPT | Mod: NC

## 2023-08-17 PROCEDURE — 92250 FUNDUS PHOTOGRAPHY W/I&R: CPT

## 2023-08-17 PROCEDURE — 92134 CPTRZ OPH DX IMG PST SGM RTA: CPT | Mod: NC

## 2023-08-17 ASSESSMENT — VISUAL ACUITY
OD_PH: 20/30+2
OS_PH: 20/25-2
OD_SC: 20/30-2
OS_SC: 20/60-3

## 2023-08-17 ASSESSMENT — TONOMETRY
OD_IOP_MMHG: 25
OS_IOP_MMHG: 24

## 2024-03-27 NOTE — PROGRESS NOTE ADULT - PROBLEM SELECTOR PLAN 4
The patient is hemodynamically stable.  The pain is controlled.  Patient is on DVT prophylaxis.  Patient is using incentive spirometry.  Oxygen saturation is acceptable.  Advance diet as tolerated.  Advance activity as tolerated.  Monitor for ileus.  Patient is on Laxatives.  Surgical wound is stable.  No indication for monitor bed.
she optimized for surgery
she optimized for surgery
BP stable  continue losartan and metoprolol
spontaneous

## 2024-12-10 PROBLEM — E11.9 TYPE 2 DIABETES MELLITUS WITHOUT COMPLICATIONS: Chronic | Status: ACTIVE | Noted: 2018-10-01

## 2024-12-30 ENCOUNTER — APPOINTMENT (OUTPATIENT)
Dept: NEPHROLOGY | Facility: CLINIC | Age: 54
End: 2024-12-30

## 2025-02-04 PROBLEM — Z00.00 ENCOUNTER FOR PREVENTIVE HEALTH EXAMINATION: Status: ACTIVE | Noted: 2025-02-04

## 2025-02-27 ENCOUNTER — APPOINTMENT (OUTPATIENT)
Dept: NEPHROLOGY | Facility: CLINIC | Age: 55
End: 2025-02-27
Payer: COMMERCIAL

## 2025-02-27 ENCOUNTER — LABORATORY RESULT (OUTPATIENT)
Age: 55
End: 2025-02-27

## 2025-02-27 VITALS — HEART RATE: 110 BPM | DIASTOLIC BLOOD PRESSURE: 103 MMHG | SYSTOLIC BLOOD PRESSURE: 224 MMHG

## 2025-02-27 VITALS — DIASTOLIC BLOOD PRESSURE: 113 MMHG | HEART RATE: 99 BPM | SYSTOLIC BLOOD PRESSURE: 205 MMHG

## 2025-02-27 VITALS — DIASTOLIC BLOOD PRESSURE: 134 MMHG | SYSTOLIC BLOOD PRESSURE: 234 MMHG | HEART RATE: 95 BPM

## 2025-02-27 VITALS — SYSTOLIC BLOOD PRESSURE: 210 MMHG | DIASTOLIC BLOOD PRESSURE: 102 MMHG

## 2025-02-27 VITALS — SYSTOLIC BLOOD PRESSURE: 211 MMHG | DIASTOLIC BLOOD PRESSURE: 107 MMHG

## 2025-02-27 VITALS — WEIGHT: 164 LBS

## 2025-02-27 DIAGNOSIS — Z86.19 PERSONAL HISTORY OF OTHER INFECTIOUS AND PARASITIC DISEASES: ICD-10-CM

## 2025-02-27 DIAGNOSIS — I70.1 ATHEROSCLEROSIS OF RENAL ARTERY: ICD-10-CM

## 2025-02-27 DIAGNOSIS — E11.69 TYPE 2 DIABETES MELLITUS WITH OTHER SPECIFIED COMPLICATION: ICD-10-CM

## 2025-02-27 DIAGNOSIS — I10 ESSENTIAL (PRIMARY) HYPERTENSION: ICD-10-CM

## 2025-02-27 DIAGNOSIS — E87.1 HYPO-OSMOLALITY AND HYPONATREMIA: ICD-10-CM

## 2025-02-27 DIAGNOSIS — E66.01 MORBID (SEVERE) OBESITY DUE TO EXCESS CALORIES: ICD-10-CM

## 2025-02-27 LAB
ALBUMIN SERPL ELPH-MCNC: 4.4 G/DL
ALP BLD-CCNC: 124 U/L
ALT SERPL-CCNC: 16 U/L
ANION GAP SERPL CALC-SCNC: 13 MMOL/L
AST SERPL-CCNC: 15 U/L
BILIRUB SERPL-MCNC: 0.4 MG/DL
BUN SERPL-MCNC: 19 MG/DL
CALCIUM SERPL-MCNC: 10 MG/DL
CHLORIDE SERPL-SCNC: 99 MMOL/L
CO2 SERPL-SCNC: 25 MMOL/L
CREAT SERPL-MCNC: 0.75 MG/DL
EGFR: 94 ML/MIN/1.73M2
GLUCOSE SERPL-MCNC: 203 MG/DL
MAGNESIUM SERPL-MCNC: 2.3 MG/DL
POTASSIUM SERPL-SCNC: 4 MMOL/L
PROT SERPL-MCNC: 7.1 G/DL
SODIUM SERPL-SCNC: 137 MMOL/L

## 2025-02-27 PROCEDURE — 36415 COLL VENOUS BLD VENIPUNCTURE: CPT

## 2025-02-27 PROCEDURE — 99204 OFFICE O/P NEW MOD 45 MIN: CPT | Mod: 25

## 2025-02-27 RX ORDER — TORSEMIDE 10 MG/1
10 TABLET ORAL DAILY
Qty: 90 | Refills: 3 | Status: ACTIVE | COMMUNITY
Start: 2025-02-27 | End: 1900-01-01

## 2025-02-27 RX ORDER — VALSARTAN 320 MG/1
320 TABLET, COATED ORAL
Qty: 90 | Refills: 3 | Status: ACTIVE | COMMUNITY
Start: 2025-02-27 | End: 1900-01-01

## 2025-02-27 RX ORDER — CARVEDILOL 6.25 MG/1
6.25 TABLET, FILM COATED ORAL
Qty: 180 | Refills: 3 | Status: ACTIVE | COMMUNITY
Start: 2025-02-27 | End: 1900-01-01

## 2025-02-28 LAB
ALDOSTERONE SERUM: 5.1 NG/DL
APPEARANCE: CLEAR
BASOPHILS # BLD AUTO: 0.04 K/UL
BASOPHILS NFR BLD AUTO: 0.4 %
BILIRUBIN URINE: NEGATIVE
BLOOD URINE: NEGATIVE
CALCIUM SERPL-MCNC: 10 MG/DL
COLOR: YELLOW
CREAT SPEC-SCNC: 104 MG/DL
CYSTATIN C SERPL-MCNC: 0.85 MG/L
EOSINOPHIL # BLD AUTO: 0.06 K/UL
EOSINOPHIL NFR BLD AUTO: 0.7 %
GFR/BSA.PRED SERPLBLD CYS-BASED-ARV: 92 ML/MIN/1.73M2
GLUCOSE QUALITATIVE U: >=1000 MG/DL
HCT VFR BLD CALC: 39.9 %
HGB BLD-MCNC: 12.9 G/DL
IMM GRANULOCYTES NFR BLD AUTO: 0.3 %
KETONES URINE: NEGATIVE MG/DL
LEUKOCYTE ESTERASE URINE: NEGATIVE
LYMPHOCYTES # BLD AUTO: 2.63 K/UL
LYMPHOCYTES NFR BLD AUTO: 29.1 %
MAN DIFF?: NORMAL
MCHC RBC-ENTMCNC: 27.4 PG
MCHC RBC-ENTMCNC: 32.3 G/DL
MCV RBC AUTO: 84.7 FL
MICROALBUMIN 24H UR DL<=1MG/L-MCNC: 25.9 MG/DL
MICROALBUMIN/CREAT 24H UR-RTO: 250 MG/G
MONOCYTES # BLD AUTO: 0.69 K/UL
MONOCYTES NFR BLD AUTO: 7.6 %
NEUTROPHILS # BLD AUTO: 5.59 K/UL
NEUTROPHILS NFR BLD AUTO: 61.9 %
NITRITE URINE: NEGATIVE
PARATHYROID HORMONE INTACT: 16 PG/ML
PH URINE: 5.5
PLATELET # BLD AUTO: 308 K/UL
PROTEIN URINE: 100 MG/DL
RBC # BLD: 4.71 M/UL
RBC # FLD: 13.9 %
SPECIFIC GRAVITY URINE: 1.03
TSH SERPL-ACNC: 1.71 UIU/ML
UROBILINOGEN URINE: 0.2 MG/DL
WBC # FLD AUTO: 9.04 K/UL

## 2025-03-04 LAB
METANEPHRINE, PL: <25 PG/ML
NORMETANEPHRINE, PL: 95.2 PG/ML

## 2025-03-06 LAB — RENIN ACTIVITY, PLASMA: 1.06 NG/ML/HR

## 2025-03-07 ENCOUNTER — OUTPATIENT (OUTPATIENT)
Dept: OUTPATIENT SERVICES | Facility: HOSPITAL | Age: 55
LOS: 1 days | End: 2025-03-07

## 2025-03-07 ENCOUNTER — APPOINTMENT (OUTPATIENT)
Dept: CT IMAGING | Facility: CLINIC | Age: 55
End: 2025-03-07
Payer: COMMERCIAL

## 2025-03-07 DIAGNOSIS — Z90.710 ACQUIRED ABSENCE OF BOTH CERVIX AND UTERUS: Chronic | ICD-10-CM

## 2025-03-07 PROCEDURE — 74175 CTA ABDOMEN W/CONTRAST: CPT | Mod: 26

## 2025-03-18 ENCOUNTER — APPOINTMENT (OUTPATIENT)
Dept: NEPHROLOGY | Facility: CLINIC | Age: 55
End: 2025-03-18
Payer: COMMERCIAL

## 2025-03-18 ENCOUNTER — NON-APPOINTMENT (OUTPATIENT)
Age: 55
End: 2025-03-18

## 2025-03-18 VITALS — WEIGHT: 163 LBS

## 2025-03-18 VITALS — SYSTOLIC BLOOD PRESSURE: 180 MMHG | DIASTOLIC BLOOD PRESSURE: 83 MMHG | HEART RATE: 89 BPM

## 2025-03-18 VITALS — DIASTOLIC BLOOD PRESSURE: 89 MMHG | SYSTOLIC BLOOD PRESSURE: 190 MMHG | HEART RATE: 91 BPM

## 2025-03-18 DIAGNOSIS — R79.9 ABNORMAL FINDING OF BLOOD CHEMISTRY, UNSPECIFIED: ICD-10-CM

## 2025-03-18 DIAGNOSIS — E66.01 MORBID (SEVERE) OBESITY DUE TO EXCESS CALORIES: ICD-10-CM

## 2025-03-18 DIAGNOSIS — E87.1 HYPO-OSMOLALITY AND HYPONATREMIA: ICD-10-CM

## 2025-03-18 DIAGNOSIS — Z86.79 PERSONAL HISTORY OF OTHER DISEASES OF THE CIRCULATORY SYSTEM: ICD-10-CM

## 2025-03-18 DIAGNOSIS — R68.89 OTHER GENERAL SYMPTOMS AND SIGNS: ICD-10-CM

## 2025-03-18 DIAGNOSIS — N18.1 CHRONIC KIDNEY DISEASE, STAGE 1: ICD-10-CM

## 2025-03-18 DIAGNOSIS — I10 ESSENTIAL (PRIMARY) HYPERTENSION: ICD-10-CM

## 2025-03-18 DIAGNOSIS — E11.69 TYPE 2 DIABETES MELLITUS WITH OTHER SPECIFIED COMPLICATION: ICD-10-CM

## 2025-03-18 DIAGNOSIS — Z79.899 OTHER LONG TERM (CURRENT) DRUG THERAPY: ICD-10-CM

## 2025-03-18 DIAGNOSIS — R80.9 PROTEINURIA, UNSPECIFIED: ICD-10-CM

## 2025-03-18 PROCEDURE — 99214 OFFICE O/P EST MOD 30 MIN: CPT | Mod: 25

## 2025-03-18 PROCEDURE — 36415 COLL VENOUS BLD VENIPUNCTURE: CPT

## 2025-03-18 RX ORDER — BEXAGLIFLOZIN 20 MG/1
20 TABLET ORAL
Qty: 90 | Refills: 3 | Status: ACTIVE | COMMUNITY
Start: 2025-03-18 | End: 1900-01-01

## 2025-03-18 RX ORDER — AMLODIPINE BESYLATE 10 MG/1
10 TABLET ORAL
Refills: 0 | Status: ACTIVE | COMMUNITY

## 2025-03-18 RX ORDER — SITAGLIPTIN 100 MG/1
100 TABLET, FILM COATED ORAL
Refills: 0 | Status: ACTIVE | COMMUNITY

## 2025-03-18 RX ORDER — GLIPIZIDE 10 MG/1
10 TABLET ORAL
Refills: 0 | Status: ACTIVE | COMMUNITY

## 2025-03-24 ENCOUNTER — NON-APPOINTMENT (OUTPATIENT)
Age: 55
End: 2025-03-24

## 2025-03-24 ENCOUNTER — APPOINTMENT (OUTPATIENT)
Dept: HEART AND VASCULAR | Facility: CLINIC | Age: 55
End: 2025-03-24
Payer: COMMERCIAL

## 2025-03-24 VITALS
HEART RATE: 100 BPM | DIASTOLIC BLOOD PRESSURE: 108 MMHG | BODY MASS INDEX: 28.06 KG/M2 | TEMPERATURE: 98.7 F | SYSTOLIC BLOOD PRESSURE: 178 MMHG | HEIGHT: 64 IN | WEIGHT: 164.38 LBS | OXYGEN SATURATION: 99 %

## 2025-03-24 DIAGNOSIS — I25.10 ATHEROSCLEROTIC HEART DISEASE OF NATIVE CORONARY ARTERY W/OUT ANGINA PECTORIS: ICD-10-CM

## 2025-03-24 PROCEDURE — G2211 COMPLEX E/M VISIT ADD ON: CPT | Mod: NC

## 2025-03-24 PROCEDURE — 93000 ELECTROCARDIOGRAM COMPLETE: CPT

## 2025-03-24 PROCEDURE — 99204 OFFICE O/P NEW MOD 45 MIN: CPT

## 2025-03-24 RX ORDER — ROSUVASTATIN CALCIUM 20 MG/1
20 TABLET, FILM COATED ORAL
Qty: 30 | Refills: 3 | Status: ACTIVE | COMMUNITY
Start: 2025-03-24 | End: 1900-01-01

## 2025-03-24 RX ORDER — ASPIRIN 81 MG/1
81 TABLET ORAL DAILY
Qty: 90 | Refills: 3 | Status: ACTIVE | COMMUNITY
Start: 2025-03-24 | End: 1900-01-01

## 2025-03-24 RX ORDER — ATORVASTATIN CALCIUM 20 MG/1
20 TABLET, FILM COATED ORAL
Refills: 0 | Status: COMPLETED | COMMUNITY
End: 2025-03-24

## 2025-03-25 ENCOUNTER — APPOINTMENT (OUTPATIENT)
Dept: HEART AND VASCULAR | Facility: CLINIC | Age: 55
End: 2025-03-25
Payer: COMMERCIAL

## 2025-03-25 VITALS — SYSTOLIC BLOOD PRESSURE: 155 MMHG | HEART RATE: 90 BPM | DIASTOLIC BLOOD PRESSURE: 90 MMHG

## 2025-03-25 DIAGNOSIS — I51.7 CARDIOMEGALY: ICD-10-CM

## 2025-03-25 PROCEDURE — 93306 TTE W/DOPPLER COMPLETE: CPT

## 2025-03-28 LAB
ALBUMIN SERPL ELPH-MCNC: 4.4 G/DL
ALDOSTERONE SERUM: 6.6 NG/DL
ALP BLD-CCNC: 156 U/L
ALT SERPL-CCNC: 15 U/L
ANION GAP SERPL CALC-SCNC: 14 MMOL/L
APPEARANCE: CLEAR
AST SERPL-CCNC: 14 U/L
BASOPHILS # BLD AUTO: 0.02 K/UL
BASOPHILS NFR BLD AUTO: 0.3 %
BILIRUB SERPL-MCNC: 0.6 MG/DL
BILIRUBIN URINE: NEGATIVE
BLOOD URINE: NEGATIVE
BUN SERPL-MCNC: 25 MG/DL
CALCIUM SERPL-MCNC: 9.8 MG/DL
CHLORIDE SERPL-SCNC: 96 MMOL/L
CHOLEST SERPL-MCNC: 440 MG/DL
CO2 SERPL-SCNC: 24 MMOL/L
COLOR: YELLOW
CREAT SERPL-MCNC: 0.85 MG/DL
CREAT SPEC-SCNC: 34 MG/DL
CYSTATIN C SERPL-MCNC: 0.94 MG/L
EGFRCR SERPLBLD CKD-EPI 2021: 81 ML/MIN/1.73M2
EOSINOPHIL # BLD AUTO: 0.1 K/UL
EOSINOPHIL NFR BLD AUTO: 1.5 %
ESTIMATED AVERAGE GLUCOSE: 303 MG/DL
GFR/BSA.PRED SERPLBLD CYS-BASED-ARV: 80 ML/MIN/1.73M2
GLUCOSE QUALITATIVE U: >=1000 MG/DL
GLUCOSE SERPL-MCNC: 441 MG/DL
HBA1C MFR BLD HPLC: 12.2 %
HCT VFR BLD CALC: 41.3 %
HDLC SERPL-MCNC: 57 MG/DL
HGB BLD-MCNC: 13.3 G/DL
IMM GRANULOCYTES NFR BLD AUTO: 0.3 %
KETONES URINE: NEGATIVE MG/DL
LDLC SERPL-MCNC: 290 MG/DL
LEUKOCYTE ESTERASE URINE: NEGATIVE
LYMPHOCYTES # BLD AUTO: 2.04 K/UL
LYMPHOCYTES NFR BLD AUTO: 31.2 %
MAGNESIUM SERPL-MCNC: 2 MG/DL
MAN DIFF?: NORMAL
MCHC RBC-ENTMCNC: 27.7 PG
MCHC RBC-ENTMCNC: 32.2 G/DL
MCV RBC AUTO: 86 FL
MICROALBUMIN 24H UR DL<=1MG/L-MCNC: 2.2 MG/DL
MICROALBUMIN/CREAT 24H UR-RTO: 64 MG/G
MONOCYTES # BLD AUTO: 0.49 K/UL
MONOCYTES NFR BLD AUTO: 7.5 %
NEUTROPHILS # BLD AUTO: 3.86 K/UL
NEUTROPHILS NFR BLD AUTO: 59.2 %
NITRITE URINE: NEGATIVE
NONHDLC SERPL-MCNC: 383 MG/DL
PH URINE: 5.5
PHOSPHATE SERPL-MCNC: 3.7 MG/DL
PLATELET # BLD AUTO: 223 K/UL
POTASSIUM SERPL-SCNC: 3.7 MMOL/L
PROT SERPL-MCNC: 7.3 G/DL
PROTEIN URINE: NEGATIVE MG/DL
RBC # BLD: 4.8 M/UL
RBC # FLD: 13.5 %
RENIN ACTIVITY, PLASMA: 1.81 NG/ML/HR
SODIUM SERPL-SCNC: 134 MMOL/L
SPECIFIC GRAVITY URINE: 1.02
TRIGL SERPL-MCNC: 389 MG/DL
UROBILINOGEN URINE: 0.2 MG/DL
WBC # FLD AUTO: 6.53 K/UL

## 2025-04-01 ENCOUNTER — APPOINTMENT (OUTPATIENT)
Dept: HEART AND VASCULAR | Facility: CLINIC | Age: 55
End: 2025-04-01

## 2025-04-22 ENCOUNTER — APPOINTMENT (OUTPATIENT)
Dept: NEPHROLOGY | Facility: CLINIC | Age: 55
End: 2025-04-22

## 2025-05-07 ENCOUNTER — TRANSCRIPTION ENCOUNTER (OUTPATIENT)
Age: 55
End: 2025-05-07

## 2025-05-07 ENCOUNTER — APPOINTMENT (OUTPATIENT)
Dept: HEART AND VASCULAR | Facility: CLINIC | Age: 55
End: 2025-05-07
Payer: COMMERCIAL

## 2025-05-07 VITALS
SYSTOLIC BLOOD PRESSURE: 186 MMHG | TEMPERATURE: 97.3 F | DIASTOLIC BLOOD PRESSURE: 116 MMHG | HEART RATE: 95 BPM | HEIGHT: 64 IN | OXYGEN SATURATION: 100 %

## 2025-05-07 DIAGNOSIS — I10 ESSENTIAL (PRIMARY) HYPERTENSION: ICD-10-CM

## 2025-05-07 PROCEDURE — G2211 COMPLEX E/M VISIT ADD ON: CPT | Mod: NC

## 2025-05-07 PROCEDURE — 99214 OFFICE O/P EST MOD 30 MIN: CPT

## 2025-05-28 ENCOUNTER — APPOINTMENT (OUTPATIENT)
Dept: NEPHROLOGY | Facility: CLINIC | Age: 55
End: 2025-05-28

## 2025-06-19 ENCOUNTER — APPOINTMENT (OUTPATIENT)
Dept: PHYSICAL MEDICINE AND REHAB | Facility: CLINIC | Age: 55
End: 2025-06-19

## 2025-07-14 ENCOUNTER — APPOINTMENT (OUTPATIENT)
Dept: HEART AND VASCULAR | Facility: CLINIC | Age: 55
End: 2025-07-14

## 2025-07-31 ENCOUNTER — APPOINTMENT (OUTPATIENT)
Dept: HEART AND VASCULAR | Facility: CLINIC | Age: 55
End: 2025-07-31